# Patient Record
Sex: MALE | Race: WHITE | Employment: OTHER | URBAN - METROPOLITAN AREA
[De-identification: names, ages, dates, MRNs, and addresses within clinical notes are randomized per-mention and may not be internally consistent; named-entity substitution may affect disease eponyms.]

---

## 2017-02-09 RX ORDER — APIXABAN 5 MG/1
TABLET, FILM COATED ORAL
Qty: 180 TAB | Refills: 0 | Status: SHIPPED | OUTPATIENT
Start: 2017-02-09 | End: 2017-05-31 | Stop reason: SDUPTHER

## 2017-02-09 RX ORDER — RAMIPRIL 10 MG/1
CAPSULE ORAL
Qty: 90 CAP | Refills: 0 | Status: SHIPPED | OUTPATIENT
Start: 2017-02-09 | End: 2017-05-19 | Stop reason: SDUPTHER

## 2017-02-09 RX ORDER — HYDROCHLOROTHIAZIDE 25 MG/1
TABLET ORAL
Qty: 90 TAB | Refills: 0 | Status: SHIPPED | OUTPATIENT
Start: 2017-02-09 | End: 2017-05-19 | Stop reason: SDUPTHER

## 2017-03-15 RX ORDER — SIMVASTATIN 20 MG/1
TABLET, FILM COATED ORAL
Qty: 90 TAB | Refills: 0 | Status: SHIPPED | OUTPATIENT
Start: 2017-03-15 | End: 2017-05-19 | Stop reason: SDUPTHER

## 2017-05-19 RX ORDER — METOPROLOL SUCCINATE 25 MG/1
TABLET, EXTENDED RELEASE ORAL
Qty: 90 TAB | Refills: 0 | Status: SHIPPED | OUTPATIENT
Start: 2017-05-19 | End: 2017-05-31 | Stop reason: SDUPTHER

## 2017-05-19 RX ORDER — RAMIPRIL 10 MG/1
CAPSULE ORAL
Qty: 90 CAP | Refills: 0 | Status: SHIPPED | OUTPATIENT
Start: 2017-05-19 | End: 2017-05-31 | Stop reason: SDUPTHER

## 2017-05-19 RX ORDER — SIMVASTATIN 20 MG/1
TABLET, FILM COATED ORAL
Qty: 90 TAB | Refills: 0 | Status: SHIPPED | OUTPATIENT
Start: 2017-05-19 | End: 2017-05-19 | Stop reason: SDUPTHER

## 2017-05-19 RX ORDER — HYDROCHLOROTHIAZIDE 25 MG/1
TABLET ORAL
Qty: 90 TAB | Refills: 0 | Status: SHIPPED | OUTPATIENT
Start: 2017-05-19 | End: 2017-05-31 | Stop reason: SDUPTHER

## 2017-05-19 RX ORDER — SIMVASTATIN 20 MG/1
TABLET, FILM COATED ORAL
Qty: 90 TAB | Refills: 0 | Status: SHIPPED | OUTPATIENT
Start: 2017-05-19 | End: 2017-05-31 | Stop reason: SDUPTHER

## 2017-05-31 ENCOUNTER — OFFICE VISIT (OUTPATIENT)
Dept: CARDIOLOGY CLINIC | Age: 82
End: 2017-05-31

## 2017-05-31 VITALS
WEIGHT: 159 LBS | HEIGHT: 65 IN | RESPIRATION RATE: 16 BRPM | SYSTOLIC BLOOD PRESSURE: 150 MMHG | DIASTOLIC BLOOD PRESSURE: 80 MMHG | OXYGEN SATURATION: 98 % | BODY MASS INDEX: 26.49 KG/M2

## 2017-05-31 DIAGNOSIS — I25.10 CORONARY ARTERY DISEASE INVOLVING NATIVE CORONARY ARTERY OF NATIVE HEART WITHOUT ANGINA PECTORIS: ICD-10-CM

## 2017-05-31 DIAGNOSIS — I10 ESSENTIAL HYPERTENSION: ICD-10-CM

## 2017-05-31 DIAGNOSIS — Z01.810 PRE-OPERATIVE CARDIOVASCULAR EXAMINATION: ICD-10-CM

## 2017-05-31 DIAGNOSIS — I35.0 NONRHEUMATIC AORTIC VALVE STENOSIS: ICD-10-CM

## 2017-05-31 DIAGNOSIS — I49.3 PVC'S (PREMATURE VENTRICULAR CONTRACTIONS): ICD-10-CM

## 2017-05-31 DIAGNOSIS — Z95.1 S/P CABG X 3: ICD-10-CM

## 2017-05-31 DIAGNOSIS — I48.19 PERSISTENT ATRIAL FIBRILLATION (HCC): Primary | ICD-10-CM

## 2017-05-31 DIAGNOSIS — E78.5 DYSLIPIDEMIA: ICD-10-CM

## 2017-05-31 RX ORDER — METOPROLOL SUCCINATE 25 MG/1
TABLET, EXTENDED RELEASE ORAL
Qty: 90 TAB | Refills: 3 | Status: SHIPPED | OUTPATIENT
Start: 2017-05-31 | End: 2017-09-28 | Stop reason: SDUPTHER

## 2017-05-31 RX ORDER — SIMVASTATIN 20 MG/1
TABLET, FILM COATED ORAL
Qty: 90 TAB | Refills: 3 | Status: SHIPPED | OUTPATIENT
Start: 2017-05-31 | End: 2017-09-28 | Stop reason: SDUPTHER

## 2017-05-31 RX ORDER — RAMIPRIL 10 MG/1
CAPSULE ORAL
Qty: 90 CAP | Refills: 3 | Status: SHIPPED | OUTPATIENT
Start: 2017-05-31 | End: 2017-09-28 | Stop reason: SDUPTHER

## 2017-05-31 RX ORDER — HYDROCHLOROTHIAZIDE 25 MG/1
TABLET ORAL
Qty: 90 TAB | Refills: 3 | Status: SHIPPED | OUTPATIENT
Start: 2017-05-31 | End: 2017-09-28 | Stop reason: SDUPTHER

## 2017-05-31 NOTE — PROGRESS NOTES
Rao Estrada     1935       Brandon Cruz MD, [facility]  Date of Visit-5/31/2017   PCP is Jaycee Ray MD   [facility]  [facility]  HPI:  Rao Estrada is a 80 y.o. male     Follow up of CAD and AF. He is doing well with no cardiac complaints today. Denies chest pain, edema, syncope or shortness of breath at rest, has no tachycardia, palpitations or sense of arrhythmia. Sedentary but when you talk, where he lives , he walks to get all his supplies etc so actually active   Living in the [facility], here twice a year to visit his friends from [employer] who bring him for appointments  Wants to get a basal lesion near his naso-labilal fold taken off in the DR by his local doctor    Assessment/Plan:       CAD s/p CABG  -no angina  -continue betablocker and statin    Persistent AF  -good rate control    Continue Eliquis    Planning to have surgery on skin lesion at left upper lip   -can stop Eliquis 48 hours prior and resume 2 days after surgery    Leg swelling resolved with HCTZ    HTN  -at reasonable goal for him     Dyslipidemia  -on statin    PLAN:  -follow up in 6 months   -BMP and lipids this morning   Future Appointments  Date Time Provider [place]   11/30/2017 11:00 AM Nidhi Santos  [address]       Impression:   1. Persistent atrial fibrillation ([address]    2. Coronary artery disease involving native coronary artery of native heart without angina pectoris    3. Dyslipidemia    4. Essential hypertension    5. Nonrheumatic aortic valve stenosis    6. PVC's (premature ventricular contractions)    7. S/P CABG x 3       Cardiac History:      CABG Off pump 4/25/14 LIMA TO LAD, SVG TO DIAG, SVG TO OM2    CATH 4/23/14 complex distal lm, ostial lcx, non dominant rca. Normal lvef    TTE 11/2012 LVEF 55 % to 60 %. No RWMA. LVH, DD. JESS. MAC, mild to mod MR, mild ASc. Mild TR. Medical history: Includes colon cancer in 2011. Cranial surgeries in  and . Fractured vertebra in . Holter monitor 10/7/14 rhythm atrial fibrillation, PVCs, rate 34-89, 28 pauses up to 3.2 seconds, no PACs, frequent PVCs, 9.85% of all beats at 9,471 with some periods of ventricular bigeminy and trigeminy. Social history: Nonsmoker. No alcohol. Drinks one cup of coffee a day. Single. No children. Enjoys painting, reading, and is retired. Family history: Mother  of breast cancer at 72. Father  of heart attack at 54. SOLO 14=EF 55 % to 60 %. Moderate LAE with \"smoke\"   Left atrial appendage: The appendage was mildly dilated. There was a probable, medium-sized, regular, solid mass, measuring 11 mm x 9 mm in the  body of the appendage. There was moderate continuous spontaneous echo contrast (\"smoke\") in the appendage. Small PFO, moderate JARON,moderate MR; mild TR  Right atrium: The atrium was moderately dilated. Aortic valve: Systolic area of 2.1 cm squared by planimetry. mild atheroma in the mid descending  Due to thrombus in LA appendage, the cardioversion is cancelled. ROS-except as noted above. . A complete cardiac and respiratory are reviewed and negative except as above ; Resp-denies wheezing  or productive cough,.  Const- No unusual weight loss or fever; Neuro-no recent seizure or CVA ; GI- No BRBPR, abdom pain, bloating ; - no  hematuria   see supplement sheet, initialed and to be scanned by staff  Past Medical History:   Diagnosis Date    Aortic stenosis 10/24/2014    [facility] 2011    CAD (coronary artery disease) 2014    Cancer of transverse colon ([facility] 2011    surgery    Dyslipidemia 2014    Gastrointestinal disorder     Heart bloc atrioventricular 2014    Hypertension     Junctional rhythm 1/3/2011    PAF (paroxysmal atrial fibrillation) (HCC)     PVC's (premature ventricular contractions) 2014    S/P CABG x 3 2014      Social Hx= reports that he has never smoked. He has never used smokeless tobacco. He reports that he does not drink alcohol or use illicit drugs. Exam and Labs:    Visit Vitals    /80 (BP 1 Location: Left arm, BP Patient Position: Sitting)    Resp 16    Ht 5' 5\" (1.651 m)    Wt 159 lb (72.1 kg)    SpO2 98%    BMI 26.46 kg/m2      Constitutional:  NAD, comfortable  Head: NC,AT. Eyes: No scleral icterus. Neck:  Neck supple. No JVD present. Throat: moist mucous membranes. Chest: Effort normal & normal respiratory excursion . Neurological: alert, conversant and oriented . Skin: Skin is not cold. No obvious systemic rash noted. Not diaphoretic. No erythema. Psychiatric:  Grossly normal mood and affect. Behavior appears normal. Extremities:  no clubbing or cyanosis. Abdomen: non distended    Lungs:breath sounds normal. No stridor. distress, wheezes or  Rales. Heart: Irregularly irregular, hyperdynamic, normal S1, S2, no murmurs, rubs, clicks or gallops , PMI non displaced. Edema: Edema is none. Lab Results   Component Value Date/Time    Cholesterol, total 160 02/02/2016 09:45 AM    HDL Cholesterol 57 02/02/2016 09:45 AM    LDL, calculated 90 02/02/2016 09:45 AM    Triglyceride 64 02/02/2016 09:45 AM    CHOL/HDL Ratio 3.7 04/24/2014 06:08 AM     No results found for this or any previous visit.    Lab Results   Component Value Date/Time    Sodium 141 02/02/2016 09:45 AM    Potassium 4.3 02/02/2016 09:45 AM    Chloride 98 02/02/2016 09:45 AM    CO2 29 02/02/2016 09:45 AM    Anion gap 6 11/07/2014 10:01 AM    Glucose 93 02/02/2016 09:45 AM    BUN 17 02/02/2016 09:45 AM    Creatinine 0.95 02/02/2016 09:45 AM    BUN/Creatinine ratio 18 02/02/2016 09:45 AM    GFR est AA 88 02/02/2016 09:45 AM    GFR est non-AA 76 02/02/2016 09:45 AM    Calcium 9.3 02/02/2016 09:45 AM      Wt Readings from Last 3 Encounters:   05/31/17 159 lb (72.1 kg)   10/27/16 164 lb 9.6 oz (74.7 kg)   02/02/16 170 lb (77.1 kg)      BP Readings from Last 3 Encounters:   05/31/17 150/80   10/27/16 154/76   02/02/16 142/88        Current Outpatient Prescriptions   Medication Sig    metoprolol succinate (TOPROL-XL) 25 mg XL tablet TAKE 1 TABLET BY MOUTH EVERY DAY    ramipril (ALTACE) 10 mg capsule TAKE 1 CAPSULE EVERY DAY    hydroCHLOROthiazide (HYDRODIURIL) 25 mg tablet TAKE 1 TABLET EVERY DAY    simvastatin (ZOCOR) 20 mg tablet TAKE 1 TABLET BY MOUTH EVERY EVENING    ELIQUIS 5 mg tablet TAKE 1 TABLET TWICE DAILY    ferrous sulfate 325 mg (65 mg iron) tablet Take  by mouth Daily (before breakfast). No current facility-administered medications for this visit. Impression see above.     Written by Kaiser Rowland, as dictated by Marizol Cortes MD.

## 2017-05-31 NOTE — LETTER
6/2/2017 10:30 AM 
 
Mr. Shannan Burnette [address] [id number] Dear Shannan Burnette: 
 
Please find your most recent results below. Resulted Orders METABOLIC PANEL, BASIC Result Value Ref Range Glucose 192 (H) 65 - 99 mg/dL BUN 27 8 - 27 mg/dL Creatinine 1.26 0.76 - 1.27 mg/dL GFR est non-AA 54 (L) >59 mL/min/1.73 GFR est AA 62 >59 mL/min/1.73  
 BUN/Creatinine ratio 21 10 - 24 Sodium 141 134 - 144 mmol/L Potassium 4.1 3.5 - 5.2 mmol/L Chloride 97 96 - 106 mmol/L  
 CO2 27 18 - 29 mmol/L Calcium 9.0 8.6 - 10.2 mg/dL Narrative Performed at:  [address]  [id number] : Calos Lama MD, Phone:  [phone] LIPID PANEL Result Value Ref Range Cholesterol, total 158 100 - 199 mg/dL Triglyceride 52 0 - 149 mg/dL HDL Cholesterol 63 >39 mg/dL VLDL, calculated 10 5 - 40 mg/dL LDL, calculated 85 0 - 99 mg/dL Narrative Performed at:  [address]  [id number] : Calos Lama MD, Phone:  [phone] CVD REPORT Result Value Ref Range INTERPRETATION NTAP   
 PDF IMAGE Not applicable Narrative Performed at:  [address] [place] [address]  [id number] : Home Nam PhD, Phone:  [phone] CKD REPORT Result Value Ref Range Interpretation Note Comment:  
   ------------------------------- 
CHRONIC KIDNEY DISEASE: 
EGFR, BLOOD PRESSURE, AND PROTEINURIA ASSESSMENT Patient's eGFR was previously outside the scope of the 
program and now is 54 mL/min/1.73mE2 corresponding to CKD 
stage 3a. Multiply eGFR by 1.159 if patient is African American. Potassium is within goal and has not changed 
significantly, was 4.3 and now is 4.1 mmol/L. EGFR, BLOOD PRESSURE, AND PROTEINURIA TREATMENT SUGGESTIONS 
- 
Guidelines recommend a target blood pressure of 140/90 mmHg or less in CKD patients to reduce cardiovascular risk and 
CKD progression. A higher blood pressure target may be 
appropriate in older individuals to avoid symptomatic 
hypotension. Assessment of albuminuria (urine 
albumin:creatinine ratio or urine protein:creatinine ratio 
preferred) is recommended at least annually in CKD patients 
for staging and disease prognosis. EGFR, BLOOD PRESSURE, AND PROTEINURIA FOLLOW-UP 
- 
fasting Renal Panel within 12 months; Spot Urine Panel is 
recommen 
ded by KDOQI guidelines, at least yearly; 
- 
BONE and MINERAL ASSESSMENT Calcium is within goal and has not changed significantly, 
was 9.3 and now is 9.0 mg/dL. Carbon Dioxide is within goal 
and has not changed significantly, was 29 and now is 27 
mmol/L. KDOQI guidelines recommend the measurement of 
25-hydroxy vitamin D in patients with CKD. BONE and MINERAL TREATMENT SUGGESTIONS 
- Interpretations require simultaneous measurements of serum 
calcium and phosphorus. BONE and MINERAL FOLLOW-UP 
- 
fasting PTH with Renal Panel and 25-Hydroxy Vitamin D are 
recommended by KDOQI guidelines, at least yearly; 
- 
LIPIDS ASSESSMENT Blood was non-fasting; interpret these results with caution. LDL-C is normal and has not changed significantly, was 90 
and now is 85 mg/dL. Triglyceride is normal and has not 
changed significantly, was 64 and now is 52 mg/dL. Non-HDL Cholesterol is optimal and has not changed significantly, 
was 103 and now is 95 mg/dL. HDL-C is high and has risen, 
wa 
s 62 and now is 63 mg/dL. LIPIDS TREATMENT SUGGESTIONS 
- Therapeutic lifestyle changes are always valuable to 
maintain optimal blood lipid status (diet, exercise, weight 
management). If at least a 50% LDL reduction from baseline 
has not been achieved, begin or increase statin. Consider 
measurement of LDL particle number or Apo B to adjudicate 
need for further LDL lowering therapy. If statin cannot be tolerated or increased, alternatives include use of an 
intestinal agent (ezetimibe or bile acid sequestrant) or 
niacin. LIPIDS FOLLOW-UP 
- 
fasting Lipid Panel within 12 months; 
- 
ANEMIA ASSESSMENT Most recent order does not include a CBC Panel or iron 
studies. ANEMIA FOLLOW-UP 
- 
CBC is recommended by KDOQI guidelines, at least yearly; 
------------------------------- DISCLAIMER These assessments and treatment suggestions are provided as 
a convenience in support of the physician-patient 
relationship and are not intended to replace the physician's 
clinical judgment. They are derived from the national 
guidelines in addition to other evidence and expert opinion. The clinician should consider this information within the 
context of clinical opinion and the individual patient. SEE GUIDANCE FOR CHRONIC KIDNEY DISEASE PROGRAM: National 
Kidney Foundation Kidney Disease Outcomes Quality Initiative 
(KDOQI (TM)), with its limitations and disclaimers, are at 
www.kidney. org/professionals/KDOQI. Kidney Disease Improving Global Outcomes (KDIGO) clinical practice guidelines are at 
http://kdigo. org/home/guidelines/. The members of 
[address] national advisory panel are listed at 
www. Litholink.com. This program is intended for patients who 
have been diagnosed with stages 3, 4, or pre-dialysis 5 CKD. It is not intended for children, pregnant patients, or 
transplant patients. Narrative Performed at:  [address] [place] [address]  [id number] : Bob Munson PhD, Phone:  [phone] RECOMMENDATIONS: 
Labs look stable. Make sure to watch your blood sugar as it was slightly elevated on your lab work. Please call me if you have any questions: [phone] Sincerely, 
 
 
Arabella Henriquez MD

## 2017-05-31 NOTE — MR AVS SNAPSHOT
Visit Information Date & Time Provider Department Dept. Phone Encounter #  
 5/31/2017 11:00 AM Nidhi Santos MD [facility] [phone] [id number] Upcoming Health Maintenance Date Due DTaP/Tdap/Td series (1 - Tdap) 11/7/1958 ZOSTER VACCINE AGE 60> 11/7/1997 GLAUCOMA SCREENING Q2Y 11/7/2002 MEDICARE YEARLY EXAM 11/7/2002 Pneumococcal 65+ High/Highest Risk (2 of 2 - PPSV23) 1/3/2016 INFLUENZA AGE 9 TO ADULT 8/1/2017 Allergies as of 5/31/2017  Review Complete On: 5/31/2017 By: Nidhi Santos MD  
 No Known Allergies Current Immunizations  Reviewed on 1/6/2011 Name Date Pneumococcal Vaccine (Unspecified Type) 1/3/2011 10:18 PM  
  
 Not reviewed this visit You Were Diagnosed With   
  
 Codes Comments Persistent atrial fibrillation (HCC)    -  Primary ICD-10-CM: I48.1 ICD-9-CM: 427.31 Coronary artery disease involving native coronary artery of native heart without angina pectoris     ICD-10-CM: I25.10 ICD-9-CM: 414.01 Dyslipidemia     ICD-10-CM: E78.5 ICD-9-CM: 272.4 Essential hypertension     ICD-10-CM: I10 
ICD-9-CM: 401.9 Nonrheumatic aortic valve stenosis     ICD-10-CM: I35.0 ICD-9-CM: 424.1 PVC's (premature ventricular contractions)     ICD-10-CM: I49.3 ICD-9-CM: 427.69 S/P CABG x 3     ICD-10-CM: Z95.1 ICD-9-CM: V45.81 Vitals BP Resp Height(growth percentile) Weight(growth percentile) SpO2 BMI  
 150/80 (BP 1 Location: Left arm, BP Patient Position: Sitting) 16 5' 5\" (1.651 m) 159 lb (72.1 kg) 98% 26.46 kg/m2 Smoking Status Never Smoker BMI and BSA Data Body Mass Index Body Surface Area  
 26.46 kg/m 2 1.82 m 2 Preferred Pharmacy Pharmacy Name Phone [facility] # [address] [facility] [phone] Your Updated Medication List  
  
   
 This list is accurate as of: 5/31/17 11:36 AM.  Always use your most recent med list.  
  
  
  
  
 ELIQUIS 5 mg tablet Generic drug:  apixaban TAKE 1 TABLET TWICE DAILY ferrous sulfate 325 mg (65 mg iron) tablet Take  by mouth Daily (before breakfast). hydroCHLOROthiazide 25 mg tablet Commonly known as:  HYDRODIURIL  
TAKE 1 TABLET EVERY DAY  
  
 metoprolol succinate 25 mg XL tablet Commonly known as:  TOPROL-XL  
TAKE 1 TABLET BY MOUTH EVERY DAY  
  
 ramipril 10 mg capsule Commonly known as:  ALTACE  
TAKE 1 CAPSULE EVERY DAY  
  
 simvastatin 20 mg tablet Commonly known as:  ZOCOR  
TAKE 1 TABLET BY MOUTH EVERY EVENING We Performed the Following LIPID PANEL [87485 CPT(R)] METABOLIC PANEL, BASIC [58676 CPT(R)] Patient Instructions Please get labs drawn at [facility]. There is one located on the 3rd floor of [facility] office building in room 304. Also, Dr. Bimal Marcelo would like you to follow up with him in 6 months. Introducing [facility]! [facility] introduces V[employer] patient portal. Now you can access parts of your medical record, email your doctor's office, and request medication refills online. 1. In your internet browser, go to [email or web address] 2. Click on the First Time User? Click Here link in the Sign In box. You will see the New Member Sign Up page. 3. Enter your [employer] Access Code exactly as it appears below. You will not need to use this code after youve completed the sign-up process. If you do not sign up before the expiration date, you must request a new code. · [employer] Access Code: [email or web address] Expires: 8/29/2017 11:35 AM 
 
4. Enter the last four digits of your Social Security Number (xxxx) and Date of Birth (mm/dd/yyyy) as indicated and click Submit. You will be taken to the next sign-up page. 5. Create a [employer] ID.  This will be your [employer] login ID and cannot be changed, so think of one that is secure and easy to remember. 6. Create a EverTrue password. You can change your password at any time. 7. Enter your Password Reset Question and Answer. This can be used at a later time if you forget your password. 8. Enter your e-mail address. You will receive e-mail notification when new information is available in [address] 9. Click Sign Up. You can now view and download portions of your medical record. 10. Click the Download Summary menu link to download a portable copy of your medical information. If you have questions, please visit the Frequently Asked Questions section of the EverTrue website. Remember, EverTrue is NOT to be used for urgent needs. For medical emergencies, dial 911. Now available from your iPhone and Android! Please provide this summary of care documentation to your next provider. Your primary care clinician is listed as Magalys Cartwright. If you have any questions after today's visit, please call [phone].

## 2017-05-31 NOTE — PATIENT INSTRUCTIONS
Please get labs drawn at [facility]. There is one located on the 3rd floor of [facility] building in room 304. Also, Dr. Tisha Santos would like you to follow up with him in 6 months.

## 2017-06-01 LAB
BUN SERPL-MCNC: 27 MG/DL (ref 8–27)
BUN/CREAT SERPL: 21 (ref 10–24)
CALCIUM SERPL-MCNC: 9 MG/DL (ref 8.6–10.2)
CHLORIDE SERPL-SCNC: 97 MMOL/L (ref 96–106)
CHOLEST SERPL-MCNC: 158 MG/DL (ref 100–199)
CO2 SERPL-SCNC: 27 MMOL/L (ref 18–29)
CREAT SERPL-MCNC: 1.26 MG/DL (ref 0.76–1.27)
GLUCOSE SERPL-MCNC: 192 MG/DL (ref 65–99)
HDLC SERPL-MCNC: 63 MG/DL
INTERPRETATION, 910389: NORMAL
INTERPRETATION: NORMAL
LDLC SERPL CALC-MCNC: 85 MG/DL (ref 0–99)
PDF IMAGE, 910387: NORMAL
POTASSIUM SERPL-SCNC: 4.1 MMOL/L (ref 3.5–5.2)
SODIUM SERPL-SCNC: 141 MMOL/L (ref 134–144)
TRIGL SERPL-MCNC: 52 MG/DL (ref 0–149)
VLDLC SERPL CALC-MCNC: 10 MG/DL (ref 5–40)

## 2017-06-02 ENCOUNTER — TELEPHONE (OUTPATIENT)
Dept: CARDIOLOGY CLINIC | Age: 82
End: 2017-06-02

## 2017-06-02 NOTE — TELEPHONE ENCOUNTER
Addy Júnior calling on behalf of pt in reference to his obtaining pt's lab results. He can be reached at [phone]. Thanks!

## 2017-06-02 NOTE — TELEPHONE ENCOUNTER
Spoke to [employer]. Identifiers x 2. Verified that Mr. Dejesus is authorized to receive information. Per Dr. Celia Molina, kidney and lipids look fine. No change in regimen. Verbalized understanding.

## 2017-06-02 NOTE — TELEPHONE ENCOUNTER
----- Message from Kvng Ellsworth MD sent at 6/2/2017  8:54 AM EDT -----  Kidneys and lipids look fine  Glucose up, he needs to watch his diabetes mellitus   If you cant get him ( he lives in \[facility]"" and is leaving [place]) look who is contact on his form

## 2017-06-02 NOTE — PROGRESS NOTES
Kidneys and lipids look fine  Glucose up, he needs to watch his diabetes mellitus   If you cant get him ( he lives in [facility] and is leaving [place]) look who is contact on his form

## 2017-10-03 RX ORDER — SIMVASTATIN 20 MG/1
TABLET, FILM COATED ORAL
Qty: 90 TAB | Refills: 1 | Status: SHIPPED | OUTPATIENT
Start: 2017-10-03 | End: 2018-07-14 | Stop reason: SDUPTHER

## 2017-10-03 RX ORDER — METOPROLOL SUCCINATE 25 MG/1
TABLET, EXTENDED RELEASE ORAL
Qty: 90 TAB | Refills: 1 | Status: SHIPPED | OUTPATIENT
Start: 2017-10-03 | End: 2018-07-14 | Stop reason: SDUPTHER

## 2017-10-03 RX ORDER — RAMIPRIL 10 MG/1
CAPSULE ORAL
Qty: 90 CAP | Refills: 1 | Status: SHIPPED | OUTPATIENT
Start: 2017-10-03 | End: 2017-10-05 | Stop reason: SDUPTHER

## 2017-10-03 RX ORDER — HYDROCHLOROTHIAZIDE 25 MG/1
TABLET ORAL
Qty: 90 TAB | Refills: 1 | Status: SHIPPED | OUTPATIENT
Start: 2017-10-03 | End: 2017-10-05 | Stop reason: SDUPTHER

## 2017-10-05 RX ORDER — RAMIPRIL 10 MG/1
CAPSULE ORAL
Qty: 90 CAP | Refills: 1 | Status: SHIPPED | OUTPATIENT
Start: 2017-10-05 | End: 2018-07-14 | Stop reason: SDUPTHER

## 2017-10-05 RX ORDER — HYDROCHLOROTHIAZIDE 25 MG/1
TABLET ORAL
Qty: 90 TAB | Refills: 1 | Status: SHIPPED | OUTPATIENT
Start: 2017-10-05 | End: 2018-07-14 | Stop reason: SDUPTHER

## 2017-10-05 NOTE — TELEPHONE ENCOUNTER
Request for altace 10mg every day and HCTZ 25mg every day. Last office visit 5/31/17, next office visit 11/30/17.  Refills per verbal order from Dr. Dragan Little.

## 2017-12-04 ENCOUNTER — OFFICE VISIT (OUTPATIENT)
Dept: CARDIOLOGY CLINIC | Age: 82
End: 2017-12-04

## 2017-12-04 VITALS
BODY MASS INDEX: 26.82 KG/M2 | RESPIRATION RATE: 16 BRPM | HEIGHT: 65 IN | DIASTOLIC BLOOD PRESSURE: 84 MMHG | HEART RATE: 64 BPM | WEIGHT: 161 LBS | OXYGEN SATURATION: 98 % | SYSTOLIC BLOOD PRESSURE: 132 MMHG

## 2017-12-04 DIAGNOSIS — I10 ESSENTIAL HYPERTENSION: ICD-10-CM

## 2017-12-04 DIAGNOSIS — I35.0 NONRHEUMATIC AORTIC VALVE STENOSIS: ICD-10-CM

## 2017-12-04 DIAGNOSIS — I48.19 PERSISTENT ATRIAL FIBRILLATION (HCC): ICD-10-CM

## 2017-12-04 DIAGNOSIS — R73.09 ELEVATED GLUCOSE: Primary | ICD-10-CM

## 2017-12-04 DIAGNOSIS — I25.10 CORONARY ARTERY DISEASE INVOLVING NATIVE CORONARY ARTERY OF NATIVE HEART WITHOUT ANGINA PECTORIS: ICD-10-CM

## 2017-12-04 DIAGNOSIS — E78.5 DYSLIPIDEMIA: ICD-10-CM

## 2017-12-04 NOTE — MR AVS SNAPSHOT
Visit Information Date & Time Provider Department Dept. Phone Encounter #  
 12/4/2017 11:00 AM Bernardino Byers MD [facility] [phone] [id number] Upcoming Health Maintenance Date Due DTaP/Tdap/Td series (1 - Tdap) 11/7/1956 ZOSTER VACCINE AGE 60> 9/7/1997 GLAUCOMA SCREENING Q2Y 11/7/2002 MEDICARE YEARLY EXAM 11/7/2002 Pneumococcal 65+ High/Highest Risk (2 of 2 - PPSV23) 1/3/2016 Influenza Age 5 to Adult 8/1/2017 Allergies as of 12/4/2017  Review Complete On: 12/4/2017 By: Yazan Duncan No Known Allergies Current Immunizations  Reviewed on 1/6/2011 Name Date ZZZ-RETIRED (DO NOT USE) Pneumococcal Vaccine (Unspecified Type) 1/3/2011 10:18 PM  
  
 Not reviewed this visit Vitals BP Pulse Resp Height(growth percentile) Weight(growth percentile) SpO2  
 132/84 (BP 1 Location: Left arm) 64 16 5' 5\" (1.651 m) 161 lb (73 kg) 98% BMI Smoking Status 26.79 kg/m2 Never Smoker Vitals History BMI and BSA Data Body Mass Index Body Surface Area  
 26.79 kg/m 2 1.83 m 2 Preferred Pharmacy Pharmacy Name Phone [id number] [place] [place] [id number] [facility] [phone] Your Updated Medication List  
  
   
This list is accurate as of: 12/4/17 11:51 AM.  Always use your most recent med list.  
  
  
  
  
 apixaban 5 mg tablet Commonly known as:  ELIQUIS  
TAKE 1 TABLET TWICE DAILY ferrous sulfate 325 mg (65 mg iron) tablet Take  by mouth Daily (before breakfast). hydroCHLOROthiazide 25 mg tablet Commonly known as:  HYDRODIURIL  
TAKE 1 TABLET BY MOUTH EVERY DAY  
  
 metoprolol succinate 25 mg XL tablet Commonly known as:  TOPROL-XL  
TAKE 1 TABLET BY MOUTH EVERY DAY  
  
 ramipril 10 mg capsule Commonly known as:  ALTACE  
TAKE 1 CAPSULE EVERY DAY  
  
 simvastatin 20 mg tablet Commonly known as:  ZOCOR  
 TAKE 1 TABLET BY MOUTH EVERY EVENING Patient Instructions Need lab work, A1C today. Please follow up with Dr. Mary Bryan in 6 months. Introducing [facility]! [facility] introduces sCoolTV patient portal. Now you can access parts of your medical record, email your doctor's office, and request medication refills online. 1. In your internet browser, go to [email or web address] 2. Click on the First Time User? Click Here link in the Sign In box. You will see the New Member Sign Up page. 3. Enter your sCoolTV Access Code exactly as it appears below. You will not need to use this code after youve completed the sign-up process. If you do not sign up before the expiration date, you must request a new code. · sCoolTV Access Code: [email or web address] Expires: 2/13/2018  2:57 PM 
 
4. Enter the last four digits of your Social Security Number (xxxx) and Date of Birth (mm/dd/yyyy) as indicated and click Submit. You will be taken to the next sign-up page. 5. Create a sCoolTV ID. This will be your sCoolTV login ID and cannot be changed, so think of one that is secure and easy to remember. 6. Create a sCoolTV password. You can change your password at any time. 7. Enter your Password Reset Question and Answer. This can be used at a later time if you forget your password. 8. Enter your e-mail address. You will receive e-mail notification when new information is available in [address] 9. Click Sign Up. You can now view and download portions of your medical record. 10. Click the Download Summary menu link to download a portable copy of your medical information. If you have questions, please visit the Frequently Asked Questions section of the sCoolTV website. Remember, sCoolTV is NOT to be used for urgent needs. For medical emergencies, dial 911. Now available from your iPhone and Android! Please provide this summary of care documentation to your next provider. Your primary care clinician is listed as Emilie Baptiste. If you have any questions after today's visit, please call [phone].

## 2017-12-07 NOTE — PROGRESS NOTES
Nitin Kim     1935       Vipjaimie Georges MD, [facility] [place]  Date of Visit-12/4/2017   PCP is lAy Sims MD   [facility]  [facility]  HPI:  Nitin Kim is a [de-identified] y.o. male     Follow up of CAD and AF. He is doing well with no cardiac complaints today. Denies chest pain, edema, syncope or shortness of breath at rest, has no tachycardia, palpitations or sense of arrhythmia. Living in the [facility], here twice a year to visit his friends from [place] who bring him for appointments  No complaints  Labs showed high glucose but he was not fasting that day  Little bit of dizziness on standing suddenly , not falling      Assessment/Plan:       CAD s/p CABG  -no angina  -continue beta-blocker and statin    Persistent AF  -good rate control  Continue Eliquis    Leg swelling resolved with HCTZ    HTN  -at reasonable goal for him     Dyslipidemia  -on statin    PLAN:  -follow up in 6 months   -check Hgb A1C  Patient Instructions   Need lab work, A1C today. Please follow up with Dr. Ke Georges in 6 months. Future Appointments  Date Time Provider [place]   8/2/2018 10:20 AM Kevin Rosenbaum MD FAYE ALMEIDA SCHED       Impression:   1. Elevated glucose    2. Coronary artery disease involving native coronary artery of native heart without angina pectoris    3. Persistent atrial fibrillation ([address]    4. Dyslipidemia    5. Essential hypertension    6. Nonrheumatic aortic valve stenosis       Cardiac History:      CABG Off pump 4/25/14 LIMA TO LAD, SVG TO DIAG, SVG TO OM2    CATH 4/23/14 complex distal lm, ostial lcx, non dominant rca. Normal lvef    TTE 11/2012 LVEF 55 % to 60 %. No RWMA. LVH, DD. JESS. MAC, mild to mod MR, mild ASc. Mild TR. Medical history: Includes colon cancer in 2011. Cranial surgeries in 2009 and 1996. Fractured vertebra in 2011.     Holter monitor 10/7/14 rhythm atrial fibrillation, PVCs, rate 34-89, 28 pauses up to 3.2 seconds, no PACs, frequent PVCs, 9.85% of all beats at 9,471 with some periods of ventricular bigeminy and trigeminy. Social history: Nonsmoker. No alcohol. Drinks one cup of coffee a day. Single. No children. Enjoys painting, reading, and is retired. Family history: Mother  of breast cancer at 72. Father  of heart attack at 54. SOLO 14=EF 55 % to 60 %. Moderate LAE with \"smoke\"   Left atrial appendage: The appendage was mildly dilated. There was a probable, medium-sized, regular, solid mass, measuring 11 mm x 9 mm in the  body of the appendage. There was moderate continuous spontaneous echo contrast (\"smoke\") in the appendage. Small PFO, moderate JARON,moderate MR; mild TR  Right atrium: The atrium was moderately dilated. Aortic valve: Systolic area of 2.1 cm squared by planimetry. mild atheroma in the mid descending  Due to thrombus in LA appendage, the cardioversion is cancelled. ROS-except as noted above. . A complete cardiac and respiratory are reviewed and negative except as above ; Resp-denies wheezing  or productive cough,. Const- No unusual weight loss or fever; Neuro-no recent seizure or CVA ; GI- No BRBPR, abdom pain, bloating ; - no  hematuria   see supplement sheet, initialed and to be scanned by staff  Past Medical History:   Diagnosis Date    Aortic stenosis 10/24/2014    [facility]    CAD (coronary artery disease) 2014    [facility] 2011    surgery    Dyslipidemia 2014    Gastrointestinal disorder     Heart bloc atrioventricular 2014    Hypertension     Junctional rhythm 1/3/2011    PAF (paroxysmal atrial fibrillation) (HCC)     PVC's (premature ventricular contractions) 2014    S/P CABG x 3 2014      Social Hx= reports that he has never smoked. He has never used smokeless tobacco. He reports that he does not drink alcohol or use illicit drugs.      Exam and Labs:    Visit Vitals  /84 (BP 1 Location: Left arm)    Pulse 64    Resp 16    Ht 5' 5\" (1.651 m)    Wt 161 lb (73 kg)    SpO2 98%    BMI 26.79 kg/m2      Constitutional:  NAD, comfortable  Head: NC,AT. Eyes: No scleral icterus. Neck:  Neck supple. No JVD present. Throat: moist mucous membranes. Chest: Effort normal & normal respiratory excursion . Neurological: alert, conversant and oriented . Skin: Skin is not cold. No obvious systemic rash noted. Not diaphoretic. No erythema. Psychiatric:  Grossly normal mood and affect. Behavior appears normal. Extremities:  no clubbing or cyanosis. Abdomen: non distended    Lungs:breath sounds normal. No stridor. distress, wheezes or  Rales. Heart: Irregularly irregular, hyperdynamic, normal S1, S2, 2/6 systolic early/decresendo  murmur at RUSB to LUSB without radiation beyond apex , rubs, clicks or gallops , PMI non displaced. Edema: Edema is none. Lab Results   Component Value Date/Time    Cholesterol, total 158 05/31/2017 12:10 PM    HDL Cholesterol 63 05/31/2017 12:10 PM    LDL, calculated 85 05/31/2017 12:10 PM    Triglyceride 52 05/31/2017 12:10 PM    CHOL/HDL Ratio 3.7 04/24/2014 06:08 AM     No results found for this or any previous visit.    Lab Results   Component Value Date/Time    Sodium 141 05/31/2017 12:10 PM    Potassium 4.1 05/31/2017 12:10 PM    Chloride 97 05/31/2017 12:10 PM    CO2 27 05/31/2017 12:10 PM    Anion gap 6 11/07/2014 10:01 AM    Glucose 192 05/31/2017 12:10 PM    BUN 27 05/31/2017 12:10 PM    Creatinine 1.26 05/31/2017 12:10 PM    BUN/Creatinine ratio 21 05/31/2017 12:10 PM    GFR est AA 62 05/31/2017 12:10 PM    GFR est non-AA 54 05/31/2017 12:10 PM    Calcium 9.0 05/31/2017 12:10 PM      Wt Readings from Last 3 Encounters:   12/04/17 161 lb (73 kg)   05/31/17 159 lb (72.1 kg)   10/27/16 164 lb 9.6 oz (74.7 kg)      BP Readings from Last 3 Encounters:   12/04/17 132/84   05/31/17 150/80   10/27/16 154/76        Current Outpatient Prescriptions Medication Sig    ramipril (ALTACE) 10 mg capsule TAKE 1 CAPSULE EVERY DAY    hydroCHLOROthiazide (HYDRODIURIL) 25 mg tablet TAKE 1 TABLET BY MOUTH EVERY DAY    simvastatin (ZOCOR) 20 mg tablet TAKE 1 TABLET BY MOUTH EVERY EVENING    metoprolol succinate (TOPROL-XL) 25 mg XL tablet TAKE 1 TABLET BY MOUTH EVERY DAY    apixaban (ELIQUIS) 5 mg tablet TAKE 1 TABLET TWICE DAILY    ferrous sulfate 325 mg (65 mg iron) tablet Take  by mouth Daily (before breakfast). No current facility-administered medications for this visit. Impression see above.

## 2018-05-07 RX ORDER — APIXABAN 5 MG/1
TABLET, FILM COATED ORAL
Qty: 180 TAB | Refills: 1 | Status: SHIPPED | OUTPATIENT
Start: 2018-05-07 | End: 2018-10-12 | Stop reason: SDUPTHER

## 2018-05-07 NOTE — TELEPHONE ENCOUNTER
Request for eliquis 5 mg, BID. Last office visit 12/4/17,   Next office visit 8/2/18.      Refills per verbal order from Dr. David Velasquez.

## 2018-07-17 RX ORDER — METOPROLOL SUCCINATE 25 MG/1
TABLET, EXTENDED RELEASE ORAL
Qty: 90 TAB | Refills: 1 | Status: SHIPPED | OUTPATIENT
Start: 2018-07-17 | End: 2019-01-15 | Stop reason: SDUPTHER

## 2018-07-17 RX ORDER — HYDROCHLOROTHIAZIDE 25 MG/1
TABLET ORAL
Qty: 90 TAB | Refills: 1 | Status: SHIPPED | OUTPATIENT
Start: 2018-07-17 | End: 2019-01-15 | Stop reason: SDUPTHER

## 2018-07-17 RX ORDER — RAMIPRIL 10 MG/1
CAPSULE ORAL
Qty: 90 CAP | Refills: 1 | Status: SHIPPED | OUTPATIENT
Start: 2018-07-17 | End: 2019-04-13 | Stop reason: SDUPTHER

## 2018-07-17 RX ORDER — SIMVASTATIN 20 MG/1
TABLET, FILM COATED ORAL
Qty: 90 TAB | Refills: 1 | Status: SHIPPED | OUTPATIENT
Start: 2018-07-17 | End: 2019-01-15 | Stop reason: SDUPTHER

## 2018-07-17 NOTE — TELEPHONE ENCOUNTER
Request for HCTZ 25mg every day, zocor 20mg every day, metoprolol 25mg every day, ramipril 10mg every day. Last office visit 12/4/17, next office visit 8/2/18. last lipid panel 5/31/17.    Refills per verbal order from Dr. Reji Pennington.

## 2018-08-30 ENCOUNTER — TELEPHONE (OUTPATIENT)
Dept: CARDIOLOGY CLINIC | Age: 83
End: 2018-08-30

## 2018-08-30 NOTE — TELEPHONE ENCOUNTER
Spoke to patient's friend, Vern Wheatley. Name noted on permission to release information. Identifiers x 2. Patient scheduled for office visit with Dr. Rosita Hough and gastroenterologist on 10-18-18. To have colonoscopy on 10-22-18. Inquired regarding holding medications. Per Dr. Rosita Hough, to hold eliquis x 48 hours prior to colonoscopy. Isauro verbalized understanding.

## 2018-10-17 NOTE — PROGRESS NOTES
Buck Villanueva     1935       Brandon Guzman MD, [facility] [place] Date of Visit-10/18/2018 PCP is Marcia Campbell MD  
[address] [place] [address] [facility] [place] [facility] HPI:  Buck Villanueva is a [de-identified] y.o. male Follow up of CAD and AFib. Lives in hospitals. Here visits friend from [place] [address] bringing him to the appointment. Prior CABG and is on [address]. Overall the pt states he is doing well. Pt believes he's doing fine on a cardiac standing point, other than ongoing palpitations which have not been changed. Pt complains of leg infection which he states it's common in the hospitals and he would get fever. Pt states that he's feeling fine with his current medications, and he stays active with physical activities such as golf. Dizziness with some of the meds rarely Denies chest pain, edema, syncope or shortness of breath at rest, has no tachycardia, or sense of arrhythmia. EKG: Afib Assessment/Plan:    
CAD s/p CABG 
-no angina 
-continue beta-blocker and statin 
  
Persistent AF 
-good rate control Continue Eliquis 
  
Leg swelling resolved with HCTZ 
  
HTN 
-at reasonable goal for him  
  
Dyslipidemia 
-on statin 
  
PLAN: 
- Overall other than occasional leg swelling/infection, he seems to be doing fine. Follow up in 6 months and continue current meds. Impression: 1. Coronary artery disease involving native coronary artery of native heart without angina pectoris 2. Persistent atrial fibrillation ([address] 3. Essential hypertension 4. Dyslipidemia 5. Nonrheumatic aortic valve stenosis 6. Pedal edema Cardiac History:  
  
CABG Off pump 4/25/14 LIMA TO LAD, SVG TO DIAG, SVG TO OM2 
 
CATH 4/23/14 complex distal lm, ostial lcx, non dominant rca. Normal lvef 
 
TTE 11/2012 LVEF 55 % to 60 %. No RWMA. LVH, DD. JESS. MAC, mild to mod MR, mild ASc. Mild TR. Medical history: Includes colon cancer in . Cranial surgeries in  and . Fractured vertebra in . Holter monitor 10/7/14 rhythm atrial fibrillation, PVCs, rate 34-89, 28 pauses up to 3.2 seconds, no PACs, frequent PVCs, 9.85% of all beats at 9,471 with some periods of ventricular bigeminy and trigeminy. Social history: Nonsmoker. No alcohol. Drinks one cup of coffee a day. Single. No children. Enjoys painting, reading, and is retired. Family history: Mother  of breast cancer at 72. Father  of heart attack at 54. SOLO 14=EF 55 % to 60 %. Moderate LAE with \"smoke\" Left atrial appendage: The appendage was mildly dilated. There was a probable, medium-sized, regular, solid mass, measuring 11 mm x 9 mm in the 
body of the appendage. There was moderate continuous spontaneous echo contrast (\"smoke\") in the appendage. Small PFO, moderate JARON,moderate MR; mild TR Right atrium: The atrium was moderately dilated. Aortic valve: Systolic area of 2.1 cm squared by planimetry. mild atheroma in the mid descending Due to thrombus in LA appendage, the cardioversion is cancelled. ROS-except as noted above. . A complete cardiac and respiratory are reviewed and negative except as above ; Resp-denies wheezing  or productive cough,. Const- No unusual weight loss or fever; Neuro-no recent seizure or CVA ; GI- No BRBPR, abdom pain, bloating ; - no  hematuria  
see supplement sheet, initialed and to be scanned by staff Past Medical History:  
Diagnosis Date  Aortic stenosis 10/24/2014  ATN (acute tubular necrosis) (HCC) 2011  CAD (coronary artery disease) 2014  [facility] 2011  
 surgery  Dyslipidemia 2014  Gastrointestinal disorder  Heart bloc atrioventricular 2014  Hypertension  Junctional rhythm 1/3/2011  PAF (paroxysmal atrial fibrillation) [facility]  PVC's (premature ventricular contractions) 2014  S/P CABG x 3 4/28/2014 Social Hx= reports that  has never smoked. he has never used smokeless tobacco. He reports that he does not drink alcohol or use drugs. Exam and Labs:  /64 (BP 1 Location: Left arm, BP Patient Position: Sitting)   Pulse 65   Resp 12   Ht 5' 5\" (1.651 m)   Wt 159 lb (72.1 kg)   SpO2 98%   BMI 26.46 kg/m² Constitutional:  NAD, comfortable Head: NC,AT. Eyes: No scleral icterus. Neck:  Neck supple. No JVD present. Throat: moist mucous membranes. Chest: Effort normal & normal respiratory excursion . Neurological: alert, conversant and oriented . Skin: Skin is not cold. No obvious systemic rash noted. Not diaphoretic. No erythema. Psychiatric:  Grossly normal mood and affect. Behavior appears normal. Extremities:  no clubbing or cyanosis. Abdomen: non distended Lungs:breath sounds normal. No stridor. distress, wheezes or  Rales. Heart:IRIR with 2/6 CRISTIAN normal S1, S2, no rubs, clicks or gallops , PMI non displaced. Edema: Edema is 1+ edema. Lab Results Component Value Date/Time Cholesterol, total 158 05/31/2017 12:10 PM  
 HDL Cholesterol 63 05/31/2017 12:10 PM  
 LDL, calculated 85 05/31/2017 12:10 PM  
 Triglyceride 52 05/31/2017 12:10 PM  
 CHOL/HDL Ratio 3.7 04/24/2014 06:08 AM  
 
Lab Results Component Value Date/Time Sodium 141 05/31/2017 12:10 PM  
 Potassium 4.1 05/31/2017 12:10 PM  
 Chloride 97 05/31/2017 12:10 PM  
 CO2 27 05/31/2017 12:10 PM  
 Anion gap 6 11/07/2014 10:01 AM  
 Glucose 192 (H) 05/31/2017 12:10 PM  
 BUN 27 05/31/2017 12:10 PM  
 Creatinine 1.26 05/31/2017 12:10 PM  
 BUN/Creatinine ratio 21 05/31/2017 12:10 PM  
 GFR est AA 62 05/31/2017 12:10 PM  
 GFR est non-AA 54 (L) 05/31/2017 12:10 PM  
 Calcium 9.0 05/31/2017 12:10 PM  
  
Wt Readings from Last 3 Encounters:  
10/18/18 159 lb (72.1 kg) 12/04/17 161 lb (73 kg) 05/31/17 159 lb (72.1 kg) BP Readings from Last 3 Encounters:  
10/18/18 118/64  
12/04/17 132/84 05/31/17 150/80 Current Outpatient Medications Medication Sig  ELIQUIS 5 mg tablet TAKE ONE TABLET BY MOUTH TWICE DAILY  hydroCHLOROthiazide (HYDRODIURIL) 25 mg tablet TAKE 1 TABLET BY MOUTH EVERY DAY  simvastatin (ZOCOR) 20 mg tablet TAKE 1 TABLET BY MOUTH EVERY EVENING  
 metoprolol succinate (TOPROL-XL) 25 mg XL tablet TAKE 1 TABLET BY MOUTH EVERY DAY  ramipril (ALTACE) 10 mg capsule TAKE ONE CAPSULE BY MOUTH ONE TIME DAILY  ferrous sulfate 325 mg (65 mg iron) tablet Take  by mouth Daily (before breakfast). No current facility-administered medications for this visit. Impression see above.   
  
Written by Navya Wilson, as dictated by Yeni Johnson MD.

## 2018-10-18 ENCOUNTER — OFFICE VISIT (OUTPATIENT)
Dept: CARDIOLOGY CLINIC | Age: 83
End: 2018-10-18

## 2018-10-18 ENCOUNTER — APPOINTMENT (OUTPATIENT)
Dept: GENERAL RADIOLOGY | Age: 83
End: 2018-10-18
Attending: EMERGENCY MEDICINE
Payer: SELF-PAY

## 2018-10-18 ENCOUNTER — APPOINTMENT (OUTPATIENT)
Dept: CT IMAGING | Age: 83
End: 2018-10-18
Attending: EMERGENCY MEDICINE
Payer: SELF-PAY

## 2018-10-18 ENCOUNTER — HOSPITAL ENCOUNTER (EMERGENCY)
Age: 83
Discharge: HOME | End: 2018-10-18
Attending: EMERGENCY MEDICINE | Admitting: EMERGENCY MEDICINE
Payer: SELF-PAY

## 2018-10-18 VITALS
DIASTOLIC BLOOD PRESSURE: 75 MMHG | SYSTOLIC BLOOD PRESSURE: 160 MMHG | RESPIRATION RATE: 18 BRPM | OXYGEN SATURATION: 97 % | HEART RATE: 67 BPM | TEMPERATURE: 98 F

## 2018-10-18 VITALS
WEIGHT: 159 LBS | HEIGHT: 65 IN | DIASTOLIC BLOOD PRESSURE: 64 MMHG | RESPIRATION RATE: 12 BRPM | BODY MASS INDEX: 26.49 KG/M2 | SYSTOLIC BLOOD PRESSURE: 118 MMHG | HEART RATE: 65 BPM | OXYGEN SATURATION: 98 %

## 2018-10-18 DIAGNOSIS — I48.19 PERSISTENT ATRIAL FIBRILLATION (HCC): ICD-10-CM

## 2018-10-18 DIAGNOSIS — I25.10 CORONARY ARTERY DISEASE INVOLVING NATIVE CORONARY ARTERY OF NATIVE HEART WITHOUT ANGINA PECTORIS: Primary | ICD-10-CM

## 2018-10-18 DIAGNOSIS — S62.617A DISPLACED FRACTURE OF PROXIMAL PHALANX OF LEFT LITTLE FINGER, INITIAL ENCOUNTER FOR CLOSED FRACTURE: ICD-10-CM

## 2018-10-18 DIAGNOSIS — E78.5 DYSLIPIDEMIA: ICD-10-CM

## 2018-10-18 DIAGNOSIS — I35.0 NONRHEUMATIC AORTIC VALVE STENOSIS: ICD-10-CM

## 2018-10-18 DIAGNOSIS — I10 ESSENTIAL HYPERTENSION: ICD-10-CM

## 2018-10-18 DIAGNOSIS — S09.90XA CLOSED HEAD INJURY, INITIAL ENCOUNTER: Primary | ICD-10-CM

## 2018-10-18 DIAGNOSIS — R60.0 PEDAL EDEMA: ICD-10-CM

## 2018-10-18 PROCEDURE — 75810000301 HC ER LEVEL 1 CLOSED TREATMNT FRACTURE/DISLOCATION

## 2018-10-18 PROCEDURE — 70450 CT HEAD/BRAIN W/O DYE: CPT

## 2018-10-18 PROCEDURE — 74011000250 HC RX REV CODE- 250: Performed by: EMERGENCY MEDICINE

## 2018-10-18 PROCEDURE — 75810000053 HC SPLINT APPLICATION

## 2018-10-18 PROCEDURE — 73140 X-RAY EXAM OF FINGER(S): CPT

## 2018-10-18 PROCEDURE — 72125 CT NECK SPINE W/O DYE: CPT

## 2018-10-18 PROCEDURE — 99283 EMERGENCY DEPT VISIT LOW MDM: CPT

## 2018-10-18 RX ORDER — BUPIVACAINE HYDROCHLORIDE 5 MG/ML
5 INJECTION, SOLUTION EPIDURAL; INTRACAUDAL ONCE
Status: COMPLETED | OUTPATIENT
Start: 2018-10-18 | End: 2018-10-18

## 2018-10-18 RX ADMIN — BUPIVACAINE HYDROCHLORIDE 25 MG: 5 INJECTION, SOLUTION EPIDURAL; INTRACAUDAL; PERINEURAL at 17:14

## 2018-10-18 NOTE — DISCHARGE INSTRUCTIONS
Head Injury: After Your Visit to the Emergency Room  Your Care Instructions  You were seen in the emergency room for a head injury. Have another adult watch you closely for the next 24 hours. Ask the person to watch for signs that your head injury is getting worse, and make sure that he or she knows what to do if these signs appear. Even though you have been released from the emergency room, you still need to watch for any problems. The doctor carefully checked you. But sometimes problems can develop later. If you have new symptoms, or if your symptoms do not get better, return to the emergency room or call your doctor right away. A concussion means you hit your head hard enough to injure your brain. If you had a concussion, you may have symptoms that last for a few days to months. You may have changes in how well you think, concentrate, or remember; changes in your sleep patterns; or other symptoms. Although this is common after a concussion, any symptoms that are new or that are getting worse could be signs of a more serious problem. A visit to the emergency room is only one step in your treatment. Even if you feel better, you still need to do what your doctor recommends, such as going to all suggested follow-up appointments and taking medicines exactly as directed. This will help you recover and help prevent future problems. How can you care for yourself at home? · Take it easy for the next few days, or longer if you are not feeling well. Do not try to do too much. · Get plenty of sleep at night. Try to rest during the day. · Ask your doctor if you can take an over-the-counter pain medicine, such as acetaminophen (Tylenol), ibuprofen (Advil, Motrin), or naproxen (Aleve). Read and follow all instructions on the label. Do not take two or more pain medicines at the same time unless the doctor told you to. Many pain medicines have acetaminophen, which is Tylenol.  Too much acetaminophen (Tylenol) can be harmful. · Put ice or a cold pack on the area for 10 to 20 minutes at a time. Put a thin cloth between the ice and your skin. · Do not drink any alcohol for 24 hours or until your doctor tells you it is okay. · Avoid activities that could lead to another head injury. Avoid contact sports until your doctor says that you can do them. An athlete should never go back into a game after a head injury. · Until your doctor says it is okay, do not drive or do other things that require you to be alert. When should you call for help? Call 911 if:  · You have twitching, jerking, or a seizure. · You passed out (lost consciousness). · You have other symptoms that you think are a medical emergency. Return to the emergency room now if:  · You continue to vomit for more than 2 hours, or you have new vomiting. · You have clear or bloody fluid coming from your nose or ears. · The person checking on you has a hard time waking you. · You are confused, cannot think clearly, or do not know where you are. · You have trouble walking or talking. · You have new weakness or numbness in any part of your body. · You have bruises around both eyes (\"raccoon eyes\"). Call your doctor today if:  · Your headaches get worse. Where can you learn more? Go to [employer].be  Enter C324 in the search box to learn more about \"Head Injury: After Your Visit to the Emergency Room. \"   © 3832-4098 Healthwise, Incorporated. Care instructions adapted under license by [employer] (which disclaims liability or warranty for this information). This care instruction is for use with your licensed healthcare professional. If you have questions about a medical condition or this instruction, always ask your healthcare professional. [address] any warranty or liability for your use of this information. Content Version: 9.4.78947;  Last Revised: July 27, 2010            Caring for Your Splint: After Your Visit  Your Care Instructions     A splint protects a broken bone or other injury. If you have a removable splint, follow your doctor's instructions and only remove the splint if your doctor says you can. Most splints can be adjusted. Your doctor will show you how to do this and will tell you when you might need to adjust the splint. Many splints are premade. Your doctor may also make a splint from plaster or fiberglass. Some splints have a built-in air cushion. Air pads are inflated to hold the injured area in place. Follow-up care is a key part of your treatment and safety. Be sure to make and go to all appointments, and call your doctor if you are having problems. It's also a good idea to know your test results and keep a list of the medicines you take. How can you care for yourself at home? General care  · Don't put any weight on a splint. If you have a walking boot, your doctor will tell you when you can put weight on it. · If the fingers or toes on the limb with the splint were not injured, wiggle them every now and then. This helps move the blood and fluids in the injured limb. · Prop up the injured arm or leg on a pillow when you ice it or anytime you sit or lie down during the next 3 days. Try to keep it above the level of your heart. This will help reduce swelling. · Put ice or cold packs on the hurt area for 10 to 20 minutes at a time. Try to do this every 1 to 2 hours for the next 3 days (when you are awake) or until the swelling goes down. Be careful not to get the splint wet. · Be safe with medicines. Read and follow all instructions on the label. ¨ If the doctor gave you a prescription medicine for pain, take it as prescribed. ¨ If you are not taking a prescription pain medicine, ask your doctor if you can take an over-the-counter medicine. · Keep up your muscle strength and tone as much as you can while protecting your injured limb or joint.  Your doctor may want you to tense and relax the muscles protected by the splint. Check with your doctor or physical therapist for instructions. Splint and skin care  · If your splint is not to be removed, try blowing cool air from a hair dryer or fan into the splint to help relieve itching. Never stick items under your splint to scratch the skin. · Do not use oils or lotions near your splint. If the skin becomes red or sore around the edge of the splint, you may pad the edges with a soft material, such as moleskin, or use tape to cover the edges. · If you're allowed to take your splint off, be sure your skin is dry before you put it back on. · Watch for pressure sores. These can develop over bony areas. Symptoms include a warm spot under the cast, pain, drainage, or an odor. Call your doctor if you think you have a pressure sore. · Watch for compartment syndrome. This happens when pressure builds up in a group of muscles, nerves, and blood vessels. It is an emergency. Symptoms include severe pain or tingling or numbness. Water and your splint  · Keep your splint dry. Moisture can collect under the splint and cause skin irritation and itching. If you have a wound or have had surgery, moisture under the splint can increase the risk of infection. · Cover your splint with at least two layers of plastic when you take a shower or bath, unless your doctor said you can take it off while bathing. · If you can take the splint off when you bathe, pat the area dry after bathing and put the splint back on.  · If your splint gets a little wet, you can dry it with a hair dryer. Use a \"cool\" setting. When should you call for help? Call your doctor now or seek immediate medical care if:  · You have increased or severe pain. · You feel a warm or painful spot under the splint. · You have problems with your splint. For example:  ¨ The skin under the splint burns or stings. ¨ The splint feels too tight. ¨ There is a lot of swelling near the splint.  (Some swelling is normal.)  ¨ You have a new fever. ¨ There is drainage or a bad smell coming from the splint. · Your foot or hand is cool or pale or changes color. · You have trouble moving your fingers or toes. · You have symptoms of a blood clot in your arm or leg (called a deep vein thrombosis). These may include:  ¨ Pain in the arm, calf, back of the knee, thigh, or groin. ¨ Redness and swelling in the arm, leg, or groin. Watch closely for changes in your health, and be sure to contact your doctor if:  · The splint is breaking apart or losing its shape. · You are not getting better as expected. Where can you learn more? Go to [employer]  Enter Q210 in the search box to learn more about \"Caring for Your Splint: After Your Visit. \"   © 0476-5465 Healthwise, Incorporated. Care instructions adapted under license by [facility] [employer] (which disclaims liability or warranty for this information). This care instruction is for use with your licensed healthcare professional. If you have questions about a medical condition or this instruction, always ask your healthcare professional. [place] [address] any warranty or liability for your use of this information.   Content Version: 29.5.434717; Current as of: June 4, 2014

## 2018-10-18 NOTE — ED TRIAGE NOTES
TRIAGE NOTE:  Patient arrives after tripping and falling, patient reports hit face denies LOC. And pinkie finger on left hand, has deformity.

## 2018-10-18 NOTE — ED PROVIDER NOTES
[de-identified] y.o. male with past medical history significant for paroxysmal atrial fibrillation, hypertension, cancer of transverse colon, PVC's, CAD, and aortic stenosis who presents from home with chief complaint of CHI and injury to L 5th finger. Patient was leaving his routine visit with Dr. Anuel Giordano today when he had a mechanical GLF in which he landed on his right face and bilateral knees onto concrete. Patient reports immediate onset of left pinky deformity and deviation, bilateral knee pain with an associated small abrasion to the right knee, but denies right facial pain. Patient endorses head trauma but denies LOC. Upon examination at [facility] ED patient reports continuation of bilateral knee pain and left pinky pain with associated deformity and deviation. Patient reports limited ROM of left pinky finger. Patient is on Eliquis. Patient reports history of breaking his bilateral wrists. Pt denies neck pain, dizziness, weakness, fever, chills, cough, congestion, shortness of breath, chest pain, abdominal pain, nausea, vomiting, diarrhea, difficulty with urination or dysuria. Old Chart Review: Patient was seen by cardiologist Dr. Sj Bundy MD in office today which was normal. Patient is on Eliquis. There are no other acute medical concerns at this time. PCP: Beth Box MD 
 
Note written by Eze Shields, as dictated by Bakari Mark DO 5:07 PM 
 
 
 
 
 
The history is provided by the patient. Past Medical History:  
Diagnosis Date  Aortic stenosis 10/24/2014  ATN (acute tubular necrosis) (HCC) 1/4/2011  CAD (coronary artery disease) 4/23/2014  Cancer of transverse colon ([facility] [address] 1/20/2011  
 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  
Procedure Laterality Date  CARDIAC SURG PROCEDURE UNLIST  mid April 2014 Triple bypass  HX COLECTOMY  1/5/2011 Transverse d/t mass - Dr. Delfino Gallardo  HX HERNIA REPAIR    
 HX TONSILLECTOMY Family History:  
Problem Relation Age of Onset  Breast Cancer Mother  Heart Attack Father Social History Socioeconomic History  Marital status: SINGLE Spouse name: Not on file  Number of children: Not on file  Years of education: Not on file  Highest education level: Not on file Social Needs  Financial resource strain: Not on file  Food insecurity - worry: Not on file  Food insecurity - inability: Not on file  Transportation needs - medical: Not on file  Transportation needs - non-medical: Not on file Occupational History  Not on file Tobacco Use  Smoking status: Never Smoker  Smokeless tobacco: Never Used Substance and Sexual Activity  Alcohol use: No  
 Drug use: No  
 Sexual activity: Not on file Other Topics Concern [address] Service Not Asked  Blood Transfusions No  
 Caffeine Concern No  
 Occupational Exposure No  
 Hobby Hazards Not Asked  Sleep Concern No  
 Stress Concern No  
 Weight Concern No  
 Special Diet Yes Comment: low cholesterol diet  Back Care Yes  Exercise Yes Comment: walking  Bike Helmet Not Asked [address] Yes  Self-Exams No  
Social History Narrative  Not on file ALLERGIES: Patient has no known allergies. Review of Systems Musculoskeletal: Positive for arthralgias (Knee pain). Left pinky pain Skin: Positive for wound. All other systems reviewed and are negative. Vitals:  
 10/18/18 1634 BP: 163/76 Pulse: 65 Resp: 18 Temp: 98.1 °F (36.7 °C) SpO2: 98% Physical Exam  
Nursing note and vitals reviewed. Constitutional: Pt is awake and alert. Pt appears well-developed and well-nourished. NAD. HENT:  
Head: Normocephalic and atraumatic. Nose: Nose normal.  
Mouth/Throat: Oropharynx is clear and moist. No oropharyngeal exudate. Eyes: Conjunctivae and extraocular motions are normal. Pupils are equal, round, and reactive to light. Right eye exhibits no discharge. Left eye exhibits no discharge. No scleral icterus. Neck: No tracheal deviation present. Supple neck. Nontender. Cardiovascular: Normal rate, regular rhythm, normal heart sounds and intact distal pulses. Exam reveals no gallop and no friction rub. No murmur heard. Pulmonary/Chest: Effort normal and breath sounds normal.  Pt  has no wheezes. Pt  has no rales. Abdominal: Soft. Pt  exhibits no distension and no mass. No tenderness. Pt  has no rebound and no guarding. Musculoskeletal:  Pt  exhibits no edema and no tenderness. Ext: . Left pinky deformity and deviation. Neurological:  Pt is alert. nonfocal neuro exam. 
Skin: Skin is warm and dry. Pt  is not diaphoretic. Small abrasion to right knee. Psychiatric:  Pt  has a normal mood and affect. Behavior is normal.  
Note written by Eze Finnegan, as dictated by Morales Borrero DO 5:07 PM 
 
 
 
 
 
[facility] Procedures PROGRESS NOTE: 
5:34 PM Provider gave a digital block to the patient's left 5th finger. reviewed xrays 6:31 PM - reduction done. Procedure note: reduction left 5th finger - prox phalanx fx. After digital block, I manipulated the digit and achieved an improved reduction/appearance - xrays ordered and reviewed - improved alignment. Joana Zazueta DO DC home Reviewed CT images as well D/w pt and son - call hand surgery tomorrow for a f/u appt. pics after reduction: 
 
 
 
 
 
 
 
Communicated with Dr Suellen Lombard about visit via [employer].

## 2018-10-18 NOTE — PROGRESS NOTES
1. Have you been to the ER, urgent care clinic since your last visit? Hospitalized since your last visit? No 
 
2. Have you seen or consulted any other health care providers outside of the [address] [place] [address] since your last visit? Include any pap smears or colon screening. No  
 
 
Pt reports Med Rec. Completed. Visit Vitals /64 (BP 1 Location: Left arm, BP Patient Position: Sitting) Pulse 65 Resp 12 Ht 5' 5\" (1.651 m) Wt 159 lb (72.1 kg) SpO2 98% BMI 26.46 kg/m²

## 2018-10-19 NOTE — ED NOTES
Discharge instructions given to pt by MD and RN . Pt has been given counseling on med use and verbalizes  understanding . Pt ambulated off unit with no signs of distress.

## 2019-01-15 DIAGNOSIS — E78.5 DYSLIPIDEMIA: ICD-10-CM

## 2019-01-15 DIAGNOSIS — I35.0 NONRHEUMATIC AORTIC (VALVE) STENOSIS: ICD-10-CM

## 2019-01-15 DIAGNOSIS — I10 ESSENTIAL HYPERTENSION: ICD-10-CM

## 2019-01-15 DIAGNOSIS — I25.10 CORONARY ARTERY DISEASE INVOLVING NATIVE CORONARY ARTERY OF NATIVE HEART WITHOUT ANGINA PECTORIS: Primary | ICD-10-CM

## 2019-01-15 RX ORDER — HYDROCHLOROTHIAZIDE 25 MG/1
TABLET ORAL
Qty: 90 TAB | Refills: 3 | Status: SHIPPED | OUTPATIENT
Start: 2019-01-15 | End: 2020-01-16

## 2019-01-15 RX ORDER — SIMVASTATIN 20 MG/1
TABLET, FILM COATED ORAL
Qty: 90 TAB | Refills: 3 | Status: SHIPPED | OUTPATIENT
Start: 2019-01-15 | End: 2020-01-16

## 2019-01-15 RX ORDER — METOPROLOL SUCCINATE 25 MG/1
TABLET, EXTENDED RELEASE ORAL
Qty: 90 TAB | Refills: 3 | Status: SHIPPED | OUTPATIENT
Start: 2019-01-15 | End: 2020-01-16

## 2019-01-15 NOTE — TELEPHONE ENCOUNTER
Request for HCTZ 25mg daily, metoprolol 25mg daily & simvastatin 20mg daily. Last office visit 10-18-18, next office visit 4-18-19.  Refills per verbal order from Dr. Abi Quick.

## 2019-04-09 ENCOUNTER — TELEPHONE (OUTPATIENT)
Dept: CARDIOLOGY CLINIC | Age: 84
End: 2019-04-09

## 2019-04-09 NOTE — TELEPHONE ENCOUNTER
Pharmacy states they received prescription refills for patient and all were written for 90-days supply except one that was written for 30-days. Asking for clarification.     Pharmacy # [phone]

## 2019-04-09 NOTE — TELEPHONE ENCOUNTER
Verified patient with two types of identifiers. [facility] pharmacist unsure of who called or why. They will call with any other questions or concerns.

## 2019-04-12 ENCOUNTER — TELEPHONE (OUTPATIENT)
Dept: CARDIOLOGY CLINIC | Age: 84
End: 2019-04-12

## 2019-04-12 NOTE — TELEPHONE ENCOUNTER
Pt's son called stating [place] and will not be back until April 16th and he is flying back on the 23rd so he would need an appointment on the 17th of April. He stated that would be the best day.   Phone #[phone]  Thanks

## 2019-04-12 NOTE — TELEPHONE ENCOUNTER
Verified patient with two types of identifiers. Rescheduled patient's April 18th appointment to the 17th. Patient's son verbalized understanding and will call with any other questions.

## 2019-04-13 DIAGNOSIS — I10 ESSENTIAL HYPERTENSION: Primary | ICD-10-CM

## 2019-04-15 RX ORDER — RAMIPRIL 10 MG/1
10 CAPSULE ORAL DAILY
Qty: 90 CAP | Refills: 3 | Status: SHIPPED | OUTPATIENT
Start: 2019-04-15 | End: 2020-03-11

## 2019-04-15 NOTE — TELEPHONE ENCOUNTER
Patient's son is calling because the Ramipril was called in for 30 day supply, but all the other medications were called in for 90 days. He is requesting that you change the Rampril to a 90 day supply so all of his meds can  at the same time. Pharmacy confirmed.      Phone: [phone]

## 2019-04-15 NOTE — TELEPHONE ENCOUNTER
Request for ramipril 10mg daily. Last office visit 10-18-18, next office visit 4-17-19.  Refills per verbal order from Dr. Margarito Brar.

## 2019-04-17 ENCOUNTER — OFFICE VISIT (OUTPATIENT)
Dept: CARDIOLOGY CLINIC | Age: 84
End: 2019-04-17

## 2019-04-17 VITALS
HEART RATE: 65 BPM | OXYGEN SATURATION: 98 % | HEIGHT: 65 IN | DIASTOLIC BLOOD PRESSURE: 60 MMHG | SYSTOLIC BLOOD PRESSURE: 120 MMHG | BODY MASS INDEX: 26.66 KG/M2 | WEIGHT: 160 LBS | RESPIRATION RATE: 16 BRPM

## 2019-04-17 DIAGNOSIS — E78.5 DYSLIPIDEMIA: ICD-10-CM

## 2019-04-17 DIAGNOSIS — I10 ESSENTIAL HYPERTENSION: ICD-10-CM

## 2019-04-17 DIAGNOSIS — I48.19 PERSISTENT ATRIAL FIBRILLATION (HCC): Primary | ICD-10-CM

## 2019-04-17 DIAGNOSIS — Z95.1 S/P CABG X 3: ICD-10-CM

## 2019-04-17 DIAGNOSIS — I25.10 CORONARY ARTERY DISEASE INVOLVING NATIVE CORONARY ARTERY OF NATIVE HEART WITHOUT ANGINA PECTORIS: ICD-10-CM

## 2019-04-17 NOTE — PROGRESS NOTES
Visit Vitals /60 (BP 1 Location: Left arm, BP Patient Position: Sitting) Pulse 65 Resp 16 Ht 5' 5\" (1.651 m) Wt 160 lb (72.6 kg) SpO2 98% BMI 26.63 kg/m²

## 2019-04-17 NOTE — PROGRESS NOTES
Savage Cook     1935       Brandon Sanon MD, [facility]  Date of Visit-4/17/2019   PCP is Nella Lopez MD   [address] [place] [address] [facility]  [facility]  HPI:  Savage Cook is a 82 y.o. male   F/u of CAD and atrial fibrillation . Prior CABG. Lives in [facility]. Here visits friend from [place] [address] bringing him to the appointment. Overall the pt states he is doing well and he feels that it's been about the same. Pt denies any bleeding issues and is anticoagulated with Eliquis. Pt has some skin lesion on his left side of his face but has not had that checked yet. Pt usually comes back for about 2 weeks but he's otherwise in [facility]. Pt is present with a family member. Denies chest pain, edema, syncope or shortness of breath at rest, has no tachycardia, palpitations or sense of arrhythmia. Assessment/Plan:     1. CAD s/p CABG   - No angina.   - Continue BB and Statin. 2. Persistent Atrial fibrillation     Rate controlled and doing excellent. On Eliquis and has no bleeding issues. 3. HTN. At goal.  Leg swelling resolved. BP Readings from Last 6 Encounters:   04/17/19 120/60   10/18/18 160/75   10/18/18 118/64   12/04/17 132/84   05/31/17 150/80   10/27/16 154/76       4. Dyslipidemia. On Statin. simva  Lab Results   Component Value Date/Time    LDL, calculated 85 05/31/2017 12:10 PM    at goal in past on lower intensity, does not want to change to higher potency statin    5. Systolic murmur  No recent echo, will address at fu  But remains NYHA 1 with no louder murmur or CHF  Future Appointments   Date Time Provider [place]   10/14/2019 10:20 AM Jameson Bajwa  [address]      Patient Instructions   You will need to follow up in clinic with Dr. Donavan Sanon in 6 months. Key CAD CHF Meds             ramipril (ALTACE) 10 mg capsule (Taking) Take 1 Cap by mouth daily.     metoprolol succinate (TOPROL-XL) 25 mg XL tablet (Taking) TAKE 1 TABLET BY MOUTH EVERY DAY    simvastatin (ZOCOR) 20 mg tablet (Taking) TAKE 1 TABLET BY MOUTH EVERY EVENING    hydroCHLOROthiazide (HYDRODIURIL) 25 mg tablet (Taking) TAKE 1 TABLET BY MOUTH EVERY DAY    apixaban (ELIQUIS) 5 mg tablet (Taking) TAKE ONE TABLET BY MOUTH TWICE DAILY            Impression:   1. Persistent atrial fibrillation ([address]    2. Coronary artery disease involving native coronary artery of native heart without angina pectoris    3. S/P CABG x 3    4. Essential hypertension    5. Dyslipidemia    6. Nonrheumatic aortic valve stenosis       Cardiac History:      CABG Off pump 14 LIMA TO LAD, SVG TO DIAG, SVG TO OM2    CATH 14 complex distal lm, ostial lcx, non dominant rca. Normal lvef    TTE 2012 LVEF 55 % to 60 %. No RWMA. LVH, DD. JESS. MAC, mild to mod MR, mild ASc. Mild TR. Medical history: Includes colon cancer in . Cranial surgeries in  and . Fractured vertebra in . Holter monitor 10/7/14 rhythm atrial fibrillation, PVCs, rate 34-89, 28 pauses up to 3.2 seconds, no PACs, frequent PVCs, 9.85% of all beats at 9,471 with some periods of ventricular bigeminy and trigeminy. Social history: Nonsmoker. No alcohol. Drinks one cup of coffee a day. Single. No children. Enjoys painting, reading, and is retired. Family history: Mother  of breast cancer at 72. Father  of heart attack at 54. SOLO 14=EF 55 % to 60 %. Moderate LAE with \"smoke\"   Left atrial appendage: The appendage was mildly dilated. There was a probable, medium-sized, regular, solid mass, measuring 11 mm x 9 mm in the  body of the appendage. There was moderate continuous spontaneous echo contrast (\"smoke\") in the appendage. Small PFO, moderate JAORN,moderate MR; mild TR  Right atrium: The atrium was moderately dilated. Aortic valve: Systolic area of 2.1 cm squared by planimetry.  mild atheroma in the mid descending  Due to thrombus in LA appendage, the cardioversion is cancelled. ROS-except as noted above. . A complete cardiac and respiratory are reviewed and negative except as above ; Resp-denies wheezing  or productive cough,. Const- No unusual weight loss or fever; Neuro-no recent seizure or CVA ; GI- No BRBPR, abdom pain, bloating ; - no  hematuria   see supplement sheet, initialed and to be scanned by staff  Past Medical History:   Diagnosis Date    Aortic stenosis 10/24/2014    ATN (acute tubular necrosis) ([facility] [address] 1/4/2011    CAD (coronary artery disease) 4/23/2014    Cancer of transverse colon (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 He has never used smokeless tobacco. He reports that he does not drink alcohol or use drugs. Exam and Labs:  /60 (BP 1 Location: Left arm, BP Patient Position: Sitting)   Pulse 65   Resp 16   Ht 5' 5\" (1.651 m)   Wt 160 lb (72.6 kg)   SpO2 98%   BMI 26.63 kg/m² Constitutional:  NAD, comfortable  Head: NC,AT. Eyes: No scleral icterus. Neck:  Neck supple. No JVD present. Throat: moist mucous membranes. Chest: Effort normal & normal respiratory excursion . Neurological: alert, conversant and oriented . Skin: Skin is not cold. No obvious systemic rash noted. Not diaphoretic. No erythema. Psychiatric:  Grossly normal mood and affect. Behavior appears normal. Extremities:  no clubbing or cyanosis. Abdomen: non distended    Lungs:breath sounds normal. No stridor. distress, wheezes or  Rales. Heart:IRIR with 1-2/6 CRISTIAN on RUSB normal rate, regular rhythm, normal S1, S2, no rubs, clicks or gallops , PMI non displaced. Edema: Edema is none.   Lab Results   Component Value Date/Time Cholesterol, total 158 05/31/2017 12:10 PM    HDL Cholesterol 63 05/31/2017 12:10 PM    LDL, calculated 85 05/31/2017 12:10 PM    Triglyceride 52 05/31/2017 12:10 PM    CHOL/HDL Ratio 3.7 04/24/2014 06:08 AM     Lab Results   Component Value Date/Time    Sodium 141 05/31/2017 12:10 PM    Potassium 4.1 05/31/2017 12:10 PM    Chloride 97 05/31/2017 12:10 PM    CO2 27 05/31/2017 12:10 PM    Anion gap 6 11/07/2014 10:01 AM    Glucose 192 (H) 05/31/2017 12:10 PM    BUN 27 05/31/2017 12:10 PM    Creatinine 1.26 05/31/2017 12:10 PM    BUN/Creatinine ratio 21 05/31/2017 12:10 PM    GFR est AA 62 05/31/2017 12:10 PM    GFR est non-AA 54 (L) 05/31/2017 12:10 PM    Calcium 9.0 05/31/2017 12:10 PM      Wt Readings from Last 3 Encounters:   04/17/19 160 lb (72.6 kg)   10/18/18 159 lb (72.1 kg)   12/04/17 161 lb (73 kg)      BP Readings from Last 3 Encounters:   04/17/19 120/60   10/18/18 160/75   10/18/18 118/64      Current Outpatient Medications   Medication Sig    ramipril (ALTACE) 10 mg capsule Take 1 Cap by mouth daily.  metoprolol succinate (TOPROL-XL) 25 mg XL tablet TAKE 1 TABLET BY MOUTH EVERY DAY    simvastatin (ZOCOR) 20 mg tablet TAKE 1 TABLET BY MOUTH EVERY EVENING    hydroCHLOROthiazide (HYDRODIURIL) 25 mg tablet TAKE 1 TABLET BY MOUTH EVERY DAY    apixaban (ELIQUIS) 5 mg tablet TAKE ONE TABLET BY MOUTH TWICE DAILY    ferrous sulfate 325 mg (65 mg iron) tablet Take  by mouth Daily (before breakfast). No current facility-administered medications for this visit. Impression see above.       Written by Malgorzata Turpin, as dictated by Brynn Carrero MD.

## 2019-10-10 DIAGNOSIS — I48.19 PERSISTENT ATRIAL FIBRILLATION (HCC): Primary | ICD-10-CM

## 2019-10-14 NOTE — TELEPHONE ENCOUNTER
Request for Eliquis 5mg BID. Last office visit 4-17-19, next office visit 10-24-19.  Refills per verbal order from Dr. Mian Prakash.

## 2019-10-24 ENCOUNTER — OFFICE VISIT (OUTPATIENT)
Dept: CARDIOLOGY CLINIC | Age: 84
End: 2019-10-24

## 2019-10-24 VITALS
WEIGHT: 153.8 LBS | BODY MASS INDEX: 25.62 KG/M2 | DIASTOLIC BLOOD PRESSURE: 90 MMHG | HEIGHT: 65 IN | SYSTOLIC BLOOD PRESSURE: 197 MMHG | HEART RATE: 62 BPM | OXYGEN SATURATION: 100 %

## 2019-10-24 DIAGNOSIS — J34.89 NASAL MASS: ICD-10-CM

## 2019-10-24 DIAGNOSIS — I35.0 NONRHEUMATIC AORTIC VALVE STENOSIS: ICD-10-CM

## 2019-10-24 DIAGNOSIS — I25.10 CORONARY ARTERY DISEASE INVOLVING NATIVE CORONARY ARTERY OF NATIVE HEART WITHOUT ANGINA PECTORIS: ICD-10-CM

## 2019-10-24 DIAGNOSIS — I48.11 LONGSTANDING PERSISTENT ATRIAL FIBRILLATION (HCC): Primary | ICD-10-CM

## 2019-10-24 DIAGNOSIS — I10 ESSENTIAL HYPERTENSION: ICD-10-CM

## 2019-10-24 DIAGNOSIS — Z95.1 S/P CABG X 3: ICD-10-CM

## 2019-10-24 DIAGNOSIS — E78.5 DYSLIPIDEMIA: ICD-10-CM

## 2019-10-24 NOTE — PROGRESS NOTES
Visit Vitals  /90 (BP 1 Location: Right arm, BP Patient Position: Supine)   Pulse 62   Ht 5' 5\" (1.651 m)   Wt 153 lb 12.8 oz (69.8 kg)   SpO2 100%   BMI 25.59 kg/m²

## 2019-10-24 NOTE — PROGRESS NOTES
Peg Bocanegra     1935       Brandon Khalil MD, [facility]  Date of Visit-10/24/2019   PCP is Tracy Stover MD   [facility]  [facility]  HPI:  Peg Bocanegra is a 82 y.o. male   6 month f/u of CAD and atrial fibrillation . Prior CABG. Lives in [facility]. Pt is present with a friend. Overall the pt states he is doing well. Pt states his heart rhythm has stayed about the same. Pt denies dizziness when standing. Pt states he has not had a biopsy on the skin lesion on the left side of his face yet. He states he has been sent to many different doctors and each one keeps sending him to other places in the medical system in the [place]. Pt notes that his skin lesion has gotten bigger. Pt notes his BP is not usually in the 323'K systolic. Pt states he has been very stressed lately trying to move from his home  in the [facility], sounds like he may have been evicted, he lives there year round and visits twice a year for 2 weeks. Denies chest pain, edema, syncope or shortness of breath at rest, has no tachycardia, palpitations or sense of arrhythmia. EKG: Atrial fibrillation  at 60           TTE 2012 LVEF 55 % to 60 %. No RWMA. LVH, DD. JESS. MAC, mild to mod MR, mild ASc. Mild TR. Medical history: Includes colon cancer in . Cranial surgeries in  and . Fractured vertebra in . Holter monitor 10/7/14 rhythm atrial fibrillation, PVCs, rate 34-89, 28 pauses up to 3.2 seconds, no PACs, frequent PVCs, 9.85% of all beats at 9,471 with some periods of ventricular bigeminy and trigeminy. Social history: Nonsmoker. No alcohol. Drinks one cup of coffee a day. Single. No children. Enjoys painting, reading, and is retired. Family history: Mother  of breast cancer at 72. Father  of heart attack at 54. SOLO 14=EF 55 % to 60 %. Moderate LAE with \"smoke\"   Left atrial appendage: The appendage was mildly dilated.  There was a probable, medium-sized, regular, solid mass, measuring 11 mm x 9 mm in the  body of the appendage. There was moderate continuous spontaneous echo contrast (\"smoke\") in the appendage. Small PFO, moderate JARON,moderate MR; mild TR  Right atrium: The atrium was moderately dilated. Aortic valve: Systolic area of 2.1 cm squared by planimetry. mild atheroma in the mid descending  Due to thrombus in LA appendage, the cardioversion is cancelled. Assessment/Plan:     1. CAD s/p CABG   - No angina. CABG Off pump 4/25/14 LIMA TO LAD, SVG TO DIAG, SVG TO OM2    - Continue BB and Statin.     CATH 4/23/14 complex distal lm, ostial lcx, non dominant rca. Normal lvef  2. Persistent Atrial fibrillation     Rate controlled and doing excellent. On Eliquis and has no bleeding issues.      3. HTN. No leg swelling. BP is high today but it is an isolated value. I am going to take BP at home for the next week. I don't want to overreact to one BP being elevated. He is asymptomatic. 4. Dyslipidemia. On Statin. Q-Botva  Lab Results   Component Value Date/Time     LDL, calculated 85 05/31/2017 12:10 PM    at goal in past on lower intensity, does not want to change to higher potency statin     5. Systolic murmur Much softer today. No clinical sx's. 6. Skin mass. Left lip. Will try to get him in with general surgery this week for a biopsy. F/u in 6 months     Impression:   1. Longstanding persistent atrial fibrillation    2. Coronary artery disease involving native coronary artery of native heart without angina pectoris    3. Essential hypertension    4. Dyslipidemia    5. Nonrheumatic aortic valve stenosis    6. S/P CABG x 3    7. Nasal mass       Cardiac History:      CABG Off pump 4/25/14 LIMA TO LAD, SVG TO DIAG, SVG TO OM2    CATH 4/23/14 complex distal lm, ostial lcx, non dominant rca. Normal lvef    TTE 11/2012 LVEF 55 % to 60 %. No RWMA. LVH, DD. JESS. MAC, mild to mod MR, mild ASc. Mild TR.   Medical history: Includes colon cancer in . Cranial surgeries in  and . Fractured vertebra in . Holter monitor 10/7/14 rhythm atrial fibrillation, PVCs, rate 34-89, 28 pauses up to 3.2 seconds, no PACs, frequent PVCs, 9.85% of all beats at 9,471 with some periods of ventricular bigeminy and trigeminy. Social history: Nonsmoker. No alcohol. Drinks one cup of coffee a day. Single. No children. Enjoys painting, reading, and is retired. Family history: Mother  of breast cancer at 72. Father  of heart attack at 54. SOLO 14=EF 55 % to 60 %. Moderate LAE with \"smoke\"   Left atrial appendage: The appendage was mildly dilated. There was a probable, medium-sized, regular, solid mass, measuring 11 mm x 9 mm in the  body of the appendage. There was moderate continuous spontaneous echo contrast (\"smoke\") in the appendage. Small PFO, moderate JARON,moderate MR; mild TR  Right atrium: The atrium was moderately dilated. Aortic valve: Systolic area of 2.1 cm squared by planimetry. mild atheroma in the mid descending  Due to thrombus in LA appendage, the cardioversion is cancelled. ROS-except as noted above. . A complete cardiac and respiratory are reviewed and negative except as above ; Resp-denies wheezing  or productive cough,.  Const- No unusual weight loss or fever; Neuro-no recent seizure or CVA ; GI- No BRBPR, abdom pain, bloating ; - no  hematuria   see supplement sheet, initialed and to be scanned by staff  Past Medical History:   Diagnosis Date    Aortic stenosis 10/24/2014    ATN [facility]acute tubular necrosis) ([facility] [address] 2011    CAD (coronary artery disease) 2014    Cancer of transverse colon ([facility] [address] 2011    surgery    Dyslipidemia 2014    Gastrointestinal disorder     Heart bloc atrioventricular 2014    Hypertension     Junctional rhythm 1/3/2011    PAF (paroxysmal atrial fibrillation) (HCC)     PVC's (premature ventricular contractions) 2014    S/P CABG x 3 2014 Social Hx= reports that he has never smoked. He has never used smokeless tobacco. He reports that he does not drink alcohol or use drugs. Exam and Labs:  /90 (BP 1 Location: Right arm, BP Patient Position: Supine)   Pulse 62   Ht 5' 5\" (1.651 m)   Wt 153 lb 12.8 oz (69.8 kg)   SpO2 100%   BMI 25.59 kg/m² Constitutional:  NAD, comfortable  Head: NC,AT. Eyes: No scleral icterus. Neck:  Neck supple. No JVD present. Throat: moist mucous membranes. Chest: Effort normal & normal respiratory excursion . Neurological: alert, conversant and oriented . Skin: Skin is not cold. No obvious systemic rash noted. Not diaphoretic. No erythema. Psychiatric:  Grossly normal mood and affect. Behavior appears normal. Extremities:  no clubbing or cyanosis. Abdomen: non distended    Lungs:breath sounds normal. No stridor. distress, wheezes or  Rales. Heart:IRIR normal rate, regular rhythm, normal S1, S2, no murmurs, rubs, clicks or gallops , PMI non displaced. Edema: Edema is none.   Lab Results   Component Value Date/Time    Cholesterol, total 158 05/31/2017 12:10 PM    HDL Cholesterol 63 05/31/2017 12:10 PM    LDL, calculated 85 05/31/2017 12:10 PM    Triglyceride 52 05/31/2017 12:10 PM    CHOL/HDL Ratio 3.7 04/24/2014 06:08 AM     Lab Results   Component Value Date/Time    Sodium 141 05/31/2017 12:10 PM    Potassium 4.1 05/31/2017 12:10 PM    Chloride 97 05/31/2017 12:10 PM    CO2 27 05/31/2017 12:10 PM    Anion gap 6 11/07/2014 10:01 AM    Glucose 192 (H) 05/31/2017 12:10 PM    BUN 27 05/31/2017 12:10 PM    Creatinine 1.26 05/31/2017 12:10 PM    BUN/Creatinine ratio 21 05/31/2017 12:10 PM    GFR est AA 62 05/31/2017 12:10 PM    GFR est non-AA 54 (L) 05/31/2017 12:10 PM    Calcium 9.0 05/31/2017 12:10 PM      Wt Readings from Last 3 Encounters:   10/24/19 153 lb 12.8 oz (69.8 kg)   04/17/19 160 lb (72.6 kg)   10/18/18 159 lb (72.1 kg)      BP Readings from Last 3 Encounters:   10/24/19 197/90   04/17/19 120/60 10/18/18 160/75      Current Outpatient Medications   Medication Sig    apixaban (ELIQUIS) 5 mg tablet Take 1 Tab by mouth two (2) times a day.  ramipril (ALTACE) 10 mg capsule Take 1 Cap by mouth daily.  metoprolol succinate (TOPROL-XL) 25 mg XL tablet TAKE 1 TABLET BY MOUTH EVERY DAY    simvastatin (ZOCOR) 20 mg tablet TAKE 1 TABLET BY MOUTH EVERY EVENING    hydroCHLOROthiazide (HYDRODIURIL) 25 mg tablet TAKE 1 TABLET BY MOUTH EVERY DAY    ferrous sulfate 325 mg (65 mg iron) tablet Take  by mouth Daily (before breakfast). No current facility-administered medications for this visit. Impression see above.       Written by Mars Bergeron, as dictated by Rah Simms MD.

## 2019-10-24 NOTE — PATIENT INSTRUCTIONS
Please keep a blood pressure diary. Please check your blood pressure once a day at different times. Be sure to make sure you are sitting still for at least five minutes with both feet flat on the floor and arm heart level. Please write down the blood pressure, heart rate, date and time. Call [employer] at [phone] after one week or send us a [employer] message.

## 2019-10-24 NOTE — Clinical Note
10/24/19 Patient: Talia Fair YOB: 1935 Date of Visit: 10/24/2019 Caprice Jennings MD 
VIA Dear Caprice Jennings MD, Thank you for referring Mr. Talia Fair to [facility] for evaluation. My notes for this consultation are attached. If you have questions, please do not hesitate to call me. I look forward to following your patient along with you.  
 
 
Sincerely, 
 
Margaret Bella MD

## 2019-10-28 ENCOUNTER — TELEPHONE (OUTPATIENT)
Dept: CARDIOLOGY CLINIC | Age: 84
End: 2019-10-28

## 2019-10-28 NOTE — TELEPHONE ENCOUNTER
Verified patient with two types of identifiers. Spoke to patient's son (on HIPAA) and let him know I made an appointment with surgical dermatology at 0900 on Wednesday the 30th. Gave him that office's address and number. Patient's son verbalized understanding and will call with any other questions.

## 2019-10-30 ENCOUNTER — OFFICE VISIT (OUTPATIENT)
Dept: DERMATOLOGY | Facility: AMBULATORY SURGERY CENTER | Age: 84
End: 2019-10-30

## 2019-10-30 ENCOUNTER — HOSPITAL ENCOUNTER (OUTPATIENT)
Dept: LAB | Age: 84
Discharge: HOME | End: 2019-10-30

## 2019-10-30 VITALS
BODY MASS INDEX: 26.66 KG/M2 | WEIGHT: 160 LBS | OXYGEN SATURATION: 98 % | SYSTOLIC BLOOD PRESSURE: 190 MMHG | HEART RATE: 76 BPM | RESPIRATION RATE: 16 BRPM | HEIGHT: 65 IN | TEMPERATURE: 97.9 F | DIASTOLIC BLOOD PRESSURE: 82 MMHG

## 2019-10-30 DIAGNOSIS — D48.5 NEOPLASM OF UNCERTAIN BEHAVIOR OF SKIN OF LIP: Primary | ICD-10-CM

## 2019-10-30 NOTE — PROGRESS NOTES
Room: 6    Identified pt with two pt identifiers(name and ). Reviewed record in preparation for visit and have obtained necessary documentation. All patient medications has been reviewed. Chief Complaint   Patient presents with    Cyst     Pt is c/o nasal cyst near his left nostril.  Skin Exam       Health Maintenance Due   Topic    DTaP/Tdap/Td series (1 - Tdap)    Shingrix Vaccine Age 50> (1 of 2)    GLAUCOMA SCREENING Q2Y     Pneumococcal 65+ years (1 of 2 - PCV13)    Influenza Age 5 to Adult        Vitals:    10/30/19 0855   BP: 190/82   Pulse: 76   Resp: 16   Temp: 97.9 °F (36.6 °C)   TempSrc: Oral   SpO2: 98%   Weight: 72.6 kg (160 lb)   Height: 5' 5\" (1.651 m)   PainSc:   0 - No pain       1. Have you been to the ER, urgent care clinic since your last visit? Hospitalized since your last visit? No    2. Have you seen or consulted any other health care providers outside of the [address] [place] [address] since your last visit? Include any pap smears or colon screening. No    Patient is accompanied by self I have received verbal consent from Ezequiel Bell to discuss any/all medical information while they are present in the room.

## 2019-10-30 NOTE — PROGRESS NOTES
Written by Colton Benito, as dictated by Black Gruber, [address]. Name: Melly Mcdonald       Age: 82 y.o. Date: 10/30/2019    Chief Complaint:   Chief Complaint   Patient presents with    Cyst     Pt is c/o nasal cyst near his left nostril.  Skin Exam       Subjective:    HPI:  Mr.. Melly Mcdonald is a 82 y.o. male who presents for the evaluation of a lesion on the left upper lip with his son. He states that the lesion appeared 1 year ago. The patient has not had prior treatment for this lesion. Associated symptoms include bothersome lesion due to location and increasing size and bleeding. The patient denies any pain. The patient was referred by Dr. Rocio Taylor for this evaluation. The patient is a resident of the [facility] (has lived there for 20 years) and returns to the [address] [place] [address] every 6 months to see Dr. Rocio Taylor. He stays with a close family friend that he states he may as well be his son. He is feeling well and in his usual state of health today. He has no current illnesses, no other skin concerns. His allergies, medications, medical, and social history are reviewed by me today. He is on blood thinning medication: Eliquis    The patient's pertinent skin history includes: No personal or family history of skin cancer. Has lived in the [facility] for the past 20 years.      ROS: Consitutional: Negative  Dermatological : positive for - skin lesion changes    Social History     Socioeconomic History    Marital status: SINGLE     Spouse name: Not on file    Number of children: Not on file    Years of education: Not on file    Highest education level: Not on file   Occupational History    Not on file   Social Needs    Financial resource strain: Not on file    Food insecurity:     Worry: Not on file     Inability: Not on file    Transportation needs:     Medical: Not on file     Non-medical: Not on file   Tobacco Use    Smoking status: Never Smoker    Smokeless tobacco: Never Used   Substance and Sexual Activity    Alcohol use: No    Drug use: No    Sexual activity: Not on file   Lifestyle    Physical activity:     Days per week: Not on file     Minutes per session: Not on file    Stress: Not on file   Relationships    Social connections:     Talks on phone: Not on file     Gets together: Not on file     Attends [employer] service: Not on file     Active member of club or organization: Not on file     Attends meetings of clubs or organizations: Not on file     Relationship status: Not on file    Intimate partner violence:     Fear of current or ex partner: Not on file     Emotionally abused: Not on file     Physically abused: Not on file     Forced sexual activity: Not on file   Other Topics Concern     Service Not Asked    Blood Transfusions No    Caffeine Concern No    Occupational Exposure No    Hobby Hazards Not Asked    Sleep Concern No    Stress Concern No    Weight Concern No    Special Diet Yes     Comment: low cholesterol diet    Back Care Yes    Exercise Yes     Comment: walking    Bike Helmet Not Asked    Seat Belt Yes    Self-Exams No   Social History Narrative    Not on file       Family History   Problem Relation Age of Onset    Breast Cancer Mother     Heart Attack Father        Past Medical History:   Diagnosis Date    Aortic stenosis 10/24/2014    ATN (acute tubular necrosis) ([facility] [address] 1/4/2011    CAD (coronary artery disease) 4/23/2014    Cancer of transverse colon ([facility] [address] 1/20/2011    surgery    Dyslipidemia 6/12/2014    Gastrointestinal disorder     Heart bloc atrioventricular 4/21/2014    Hypertension     Junctional rhythm 1/3/2011    PAF (paroxysmal atrial fibrillation) ([facility] [address]     PVC's (premature ventricular contractions) 9/8/2014    S/P CABG x 3 4/28/2014       Past Surgical History:   Procedure Laterality Date    CARDIAC SURG PROCEDURE UNLIST  mid April 2014    Triple bypass    HX COLECTOMY  1/5/2011 Transverse d/t mass - Dr. Barrington Rowland HX HERNIA REPAIR      HX TONSILLECTOMY         Current Outpatient Medications   Medication Sig Dispense Refill    apixaban (ELIQUIS) 5 mg tablet Take 1 Tab by mouth two (2) times a day. 180 Tab 3    ramipril (ALTACE) 10 mg capsule Take 1 Cap by mouth daily. 90 Cap 3    metoprolol succinate (TOPROL-XL) 25 mg XL tablet TAKE 1 TABLET BY MOUTH EVERY DAY 90 Tab 3    simvastatin (ZOCOR) 20 mg tablet TAKE 1 TABLET BY MOUTH EVERY EVENING 90 Tab 3    hydroCHLOROthiazide (HYDRODIURIL) 25 mg tablet TAKE 1 TABLET BY MOUTH EVERY DAY 90 Tab 3    ferrous sulfate 325 mg (65 mg iron) tablet Take  by mouth Daily (before breakfast). No Known Allergies      Objective:    Visit Vitals  /82 (BP 1 Location: Left arm, BP Patient Position: Sitting)   Pulse 76   Temp 97.9 °F (36.6 °C) (Oral)   Resp 16   Ht 5' 5\" (1.651 m)   Wt 160 lb (72.6 kg)   SpO2 98%   BMI 26.63 kg/m²       Osiris Valenzuela is a 82 y.o. male who appears well and in no distress. He is awake, alert, and oriented. There is no preauricular, submandibular, or cervical lymphadenopathy. A limited skin examination was completed including the face and scalp. There are scattered seborrheic keratoses. He has lentigines. There are thin scaled AKs on the left sideburn, cheek and scalp, he has a blue nevus on the left vertex scalp. There is a firm large mass on the left upper cutaneous and vermilion lip that extends up to the nasal/ lip triangle, down the melolabial fold, up into the nostril, onto the ala, and about half way across the lip. This is most consistent with advanced Basal Cell Carcinoma. Photos from today's visit:               Left upper lip      Assessment/Plan:  Neoplasm of Uncertain Behavior, left upper lip, favors BCC. The differential diagnoses were discussed. A shave biopsy was advised to sample this lesion. The procedure was reviewed and verbal and written consent were obtained.   The risks of pain, bleeding, infection, and scar were discussed. The patient is aware that this is a sample and is intended for diagnosis and not therapy of the skin lesion. I performed the procedure. The site was cleansed and anesthetized with 1% Lidocaine with Epinephrine 1:100,000. A shave biopsy was performed to sample the lesion. Drysol was used for hemostasis. The wound was bandaged and care reviewed. The specimen was sent to pathology. I will contact the patient with the results and any further treatment that may be necessary. Potentials for treating were discussed including surgery with reconstruction by a plastic surgeon vs radiation vs oral medication (Erivedge). Risks, side effects, and benefits were also discussed. I feel Elwyn  is likely the best option for him given his living situation, age, medical status. He was given a handout to review about this medication. Actinic keratoses, seborrheic keratoses, blue nevus. No treatment for now. Will address AKs at a later date as appropriate. This plan was reviewed with the patient and patient agrees. All questions were answered. This scribe documentation was reviewed by me and accurately reflects the examination and decisions made by me. [facility]   OFFICE PROCEDURE PROGRESS NOTE   Chart reviewed for the following:   Oscar MORALES, have reviewed the History, Physical and updated the Allergic reactions for Ran Syed. TIME OUT performed immediately prior to start of procedure:   ISamantha, have performed the following reviews on Ran [place]   prior to the start of the procedure:     * Patient was identified by name and date of birth   * Agreement on procedure being performed was verified   * Risks and Benefits explained to the patient   * Procedure site verified and marked as necessary   * Patient was positioned for comfort   * Consent was signed and verified     Time: 9:20 am  Date of procedure: 10/30/2019  Procedure performed by: Erminio Duane.  Fayrene Arch  Provider assisted by: Abigail Seth LPN  Patient assisted by: self   How tolerated by patient: tolerated the procedure well with no complications   Comments: none

## 2019-11-01 ENCOUNTER — OFFICE VISIT (OUTPATIENT)
Dept: DERMATOLOGY | Facility: AMBULATORY SURGERY CENTER | Age: 84
End: 2019-11-01

## 2019-11-01 VITALS
TEMPERATURE: 97.6 F | RESPIRATION RATE: 14 BRPM | DIASTOLIC BLOOD PRESSURE: 90 MMHG | BODY MASS INDEX: 26.66 KG/M2 | HEART RATE: 76 BPM | OXYGEN SATURATION: 95 % | WEIGHT: 160 LBS | HEIGHT: 65 IN | SYSTOLIC BLOOD PRESSURE: 158 MMHG

## 2019-11-01 DIAGNOSIS — C44.01 BASAL CELL CARCINOMA (BCC) OF SKIN OF LIP: Primary | ICD-10-CM

## 2019-11-01 NOTE — PROGRESS NOTES
Written by Gera Vazquez, as dictated by Saray Perez, [address]. Name: Osiris Valenzuela       Age: 82 y.o. Date: 11/1/2019    Chief Complaint:   Chief Complaint   Patient presents with    Skin Exam     consult       Subjective:    HPI:  Mr.. Osiris Valenzuela is a 82 y.o. male who presents for consultation and test results of biopsy proven BCC on the left upper lip and treatment with Erivedge. The patient has some post biopsy bleeding but doing well now. He is leaving tomorrow to return back home to the [facility]. He is feeling well and in his usual state of health today. He has no current illnesses, no other skin concerns. His allergies, medications, medical, and social history are reviewed by me today.     ROS: Consitutional: Negative  Dermatological : negative    Social History     Socioeconomic History    Marital status: SINGLE     Spouse name: Not on file    Number of children: Not on file    Years of education: Not on file    Highest education level: Not on file   Occupational History    Not on file   Social Needs    Financial resource strain: Not on file    Food insecurity:     Worry: Not on file     Inability: Not on file    Transportation needs:     Medical: Not on file     Non-medical: Not on file   Tobacco Use    Smoking status: Never Smoker    Smokeless tobacco: Never Used   Substance and Sexual Activity    Alcohol use: No    Drug use: No    Sexual activity: Not on file   Lifestyle    Physical activity:     Days per week: Not on file     Minutes per session: Not on file    Stress: Not on file   Relationships    Social connections:     Talks on phone: Not on file     Gets together: Not on file     Attends [employer] service: Not on file     Active member of club or organization: Not on file     Attends meetings of clubs or organizations: Not on file     Relationship status: Not on file    Intimate partner violence:     Fear of current or ex partner: Not on file Emotionally abused: Not on file     Physically abused: Not on file     Forced sexual activity: Not on file   Other Topics Concern     Service Not Asked    Blood Transfusions No    Caffeine Concern No    Occupational Exposure No    Hobby Hazards Not Asked    Sleep Concern No    Stress Concern No    Weight Concern No    Special Diet Yes     Comment: low cholesterol diet    Back Care Yes    Exercise Yes     Comment: walking    Bike Helmet Not Asked    Seat Belt Yes    Self-Exams No   Social History Narrative    Not on file       Family History   Problem Relation Age of Onset    Breast Cancer Mother     Heart Attack Father        Past Medical History:   Diagnosis Date    Aortic stenosis 10/24/2014    ATN (acute tubular necrosis) ([facility] [address] 1/4/2011    CAD (coronary artery disease) 4/23/2014    Cancer of transverse colon ([facility] [address] 1/20/2011    surgery    Dyslipidemia 6/12/2014    Gastrointestinal disorder     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 Brenton Kuar HX HERNIA REPAIR      HX TONSILLECTOMY         Current Outpatient Medications   Medication Sig Dispense Refill    apixaban (ELIQUIS) 5 mg tablet Take 1 Tab by mouth two (2) times a day. 180 Tab 3    ramipril (ALTACE) 10 mg capsule Take 1 Cap by mouth daily.  90 Cap 3    metoprolol succinate (TOPROL-XL) 25 mg XL tablet TAKE 1 TABLET BY MOUTH EVERY DAY 90 Tab 3    simvastatin (ZOCOR) 20 mg tablet TAKE 1 TABLET BY MOUTH EVERY EVENING 90 Tab 3    hydroCHLOROthiazide (HYDRODIURIL) 25 mg tablet TAKE 1 TABLET BY MOUTH EVERY DAY 90 Tab 3    ferrous sulfate 325 mg (65 mg iron) tablet Take  by mouth Daily (before breakfast). No Known Allergies      Objective:    Visit Vitals  /90 (BP 1 Location: Left arm, BP Patient Position: Sitting)   Pulse 76   Temp 97.6 °F (36.4 °C) (Oral)   Resp 14   Ht 5' 5\" (1.651 m)   Wt 160 lb (72.6 kg)   SpO2 95%   BMI 26.63 kg/m²       Ra Rene is a 82 y.o. male who appears well and in no distress. He is awake, alert, and oriented. There is a large firm tumor on the left upper lip as noted a few days ago with a healing biopsy site. Assessment/Plan:  Advanced Basal Cell Carcinoma of the left upper lip, nose. Discussed with the patient that Benjaman Diana is mostly likely the best option for treatment versus surgery due to patient being on blood thinners, his age, residence most of the year. Also discussed that taking this medication should not react with other medications that he is currently taking. I highly recommended and encouraged that the patient take L-Carnitine to alleviate potential side effect of muscle cramps and also discussed other side effects. The patient will take Erivedge once a day unless side effects become daily or bothersome. Today, we discussed the patient access program for assistance in getting this medication. He filled out his patient form which I will fax today to start the process. I will keep him updated. The questions of the pt and his family friends were answered. Labs were ordered for baseline CBC, Compmet. I spent 20 minutes on care coordination, discussion of the medication, side effects and alternative treatments. This plan was reviewed with the patient and patient agrees. All questions were answered. This scribe documentation was reviewed by me and accurately reflects the examination and decisions made by me.

## 2019-11-01 NOTE — PROGRESS NOTES
I spoke w/ the pt in person today and his friend Michael Ma. Discussed the diagnosis. Plan is for him to start [address] when we can get access to the meds with pt assistance program. Labs ordered today.

## 2019-11-02 LAB
ALBUMIN SERPL-MCNC: 4.6 G/DL (ref 3.5–4.7)
ALBUMIN/GLOB SERPL: 1.5 {RATIO} (ref 1.2–2.2)
ALP SERPL-CCNC: 71 IU/L (ref 39–117)
ALT SERPL-CCNC: 13 IU/L (ref 0–44)
AST SERPL-CCNC: 26 IU/L (ref 0–40)
BASOPHILS # BLD AUTO: 0.1 X10E3/UL (ref 0–0.2)
BASOPHILS NFR BLD AUTO: 1 %
BILIRUB SERPL-MCNC: 1.1 MG/DL (ref 0–1.2)
BUN SERPL-MCNC: 29 MG/DL (ref 8–27)
BUN/CREAT SERPL: 22 (ref 10–24)
CALCIUM SERPL-MCNC: 9.6 MG/DL (ref 8.6–10.2)
CHLORIDE SERPL-SCNC: 99 MMOL/L (ref 96–106)
CO2 SERPL-SCNC: 26 MMOL/L (ref 20–29)
CREAT SERPL-MCNC: 1.34 MG/DL (ref 0.76–1.27)
EOSINOPHIL # BLD AUTO: 0.2 X10E3/UL (ref 0–0.4)
EOSINOPHIL NFR BLD AUTO: 2 %
ERYTHROCYTE [DISTWIDTH] IN BLOOD BY AUTOMATED COUNT: 12.3 % (ref 12.3–15.4)
GLOBULIN SER CALC-MCNC: 3.1 G/DL (ref 1.5–4.5)
GLUCOSE SERPL-MCNC: 104 MG/DL (ref 65–99)
HCT VFR BLD AUTO: 36.6 % (ref 37.5–51)
HGB BLD-MCNC: 12.3 G/DL (ref 13–17.7)
IMM GRANULOCYTES # BLD AUTO: 0 X10E3/UL (ref 0–0.1)
IMM GRANULOCYTES NFR BLD AUTO: 0 %
LYMPHOCYTES # BLD AUTO: 1.8 X10E3/UL (ref 0.7–3.1)
LYMPHOCYTES NFR BLD AUTO: 25 %
MCH RBC QN AUTO: 31.7 PG (ref 26.6–33)
MCHC RBC AUTO-ENTMCNC: 33.6 G/DL (ref 31.5–35.7)
MCV RBC AUTO: 94 FL (ref 79–97)
MONOCYTES # BLD AUTO: 0.6 X10E3/UL (ref 0.1–0.9)
MONOCYTES NFR BLD AUTO: 8 %
NEUTROPHILS # BLD AUTO: 4.6 X10E3/UL (ref 1.4–7)
NEUTROPHILS NFR BLD AUTO: 64 %
PLATELET # BLD AUTO: 240 X10E3/UL (ref 150–450)
POTASSIUM SERPL-SCNC: 4 MMOL/L (ref 3.5–5.2)
PROT SERPL-MCNC: 7.7 G/DL (ref 6–8.5)
RBC # BLD AUTO: 3.88 X10E6/UL (ref 4.14–5.8)
SODIUM SERPL-SCNC: 143 MMOL/L (ref 134–144)
WBC # BLD AUTO: 7.2 X10E3/UL (ref 3.4–10.8)

## 2019-11-04 NOTE — PROGRESS NOTES
I spoke with Varghese Barriga, his friend about his labs. Mildly anemic and kidney functions are mildly abnormal. Will forward labs to Cardiologist for their information. I suggested Varghese Barriga tell him to make sure he is drinking adequate fluids and then we will recheck his labs after he starts the [address].  He expressed understanding and knows that after he speaks with the pt  - they may contact me at any time with questions/ concerns

## 2019-11-12 ENCOUNTER — TELEPHONE (OUTPATIENT)
Dept: DERMATOLOGY | Facility: AMBULATORY SURGERY CENTER | Age: 84
End: 2019-11-12

## 2019-11-12 NOTE — TELEPHONE ENCOUNTER
I spoke w/ Lenchoa Neighbours and discussed that the Jjaa Bump was approved free of charge. He will monitor for the shipment of the medication and if not arrived by next Monday, contact our office. I suggest 2 month labs and then telemedicine evaluation. He will return in 3 months.

## 2019-12-30 ENCOUNTER — TELEPHONE (OUTPATIENT)
Dept: DERMATOLOGY | Facility: AMBULATORY SURGERY CENTER | Age: 84
End: 2019-12-30

## 2019-12-30 NOTE — TELEPHONE ENCOUNTER
I spoke w/ Corrinne Crater and he states Mr. Gerrie Leyden has been on the [address] for 2 weeks w/o side effects at this point. He is taking the L-Carnatine as well. I asked Isauro to call with concerns/ problems. Otherwise we will f/up in February as scheduled.

## 2020-01-14 DIAGNOSIS — I10 ESSENTIAL HYPERTENSION: ICD-10-CM

## 2020-01-14 DIAGNOSIS — I35.0 NONRHEUMATIC AORTIC (VALVE) STENOSIS: ICD-10-CM

## 2020-01-14 DIAGNOSIS — I48.11 LONGSTANDING PERSISTENT ATRIAL FIBRILLATION (HCC): Primary | ICD-10-CM

## 2020-01-14 DIAGNOSIS — E78.5 DYSLIPIDEMIA: ICD-10-CM

## 2020-01-14 DIAGNOSIS — I25.10 CORONARY ARTERY DISEASE INVOLVING NATIVE CORONARY ARTERY OF NATIVE HEART WITHOUT ANGINA PECTORIS: ICD-10-CM

## 2020-01-16 RX ORDER — METOPROLOL SUCCINATE 25 MG/1
25 TABLET, EXTENDED RELEASE ORAL DAILY
Qty: 90 TAB | Refills: 3 | Status: SHIPPED | OUTPATIENT
Start: 2020-01-16 | End: 2020-12-14

## 2020-01-16 RX ORDER — SIMVASTATIN 20 MG/1
20 TABLET, FILM COATED ORAL
Qty: 90 TAB | Refills: 3 | Status: SHIPPED | OUTPATIENT
Start: 2020-01-16 | End: 2020-12-14

## 2020-01-16 RX ORDER — HYDROCHLOROTHIAZIDE 25 MG/1
25 TABLET ORAL DAILY
Qty: 90 TAB | Refills: 3 | Status: SHIPPED | OUTPATIENT
Start: 2020-01-16 | End: 2021-03-02

## 2020-01-16 NOTE — TELEPHONE ENCOUNTER
Request for HCTZ 25mg daily, Toprol 25mg daily and simvastatin 20mg QHS. Last office visit 10-24-19, next office visit 4-24-20.  Refills per verbal order from Dr. Nanci Corea.

## 2020-02-13 ENCOUNTER — TELEPHONE (OUTPATIENT)
Dept: DERMATOLOGY | Facility: AMBULATORY SURGERY CENTER | Age: 85
End: 2020-02-13

## 2020-02-13 NOTE — TELEPHONE ENCOUNTER
I spoke w/ Dwain Headings and he sent me labs and photos to update on Mr. Stratton Blazing progress. The labs look fine to continue the Erivedge. The photo shows obvious improvement in the tumor size. We arranged an appt for 3/25 at 10am for further evaluation.

## 2020-03-11 DIAGNOSIS — I10 ESSENTIAL HYPERTENSION: ICD-10-CM

## 2020-03-11 RX ORDER — RAMIPRIL 10 MG/1
10 CAPSULE ORAL DAILY
Qty: 90 CAP | Refills: 3 | Status: SHIPPED | OUTPATIENT
Start: 2020-03-11 | End: 2021-03-02

## 2020-03-11 NOTE — TELEPHONE ENCOUNTER
Request for Ramipril 10mg daily. Last office visit 10-24-19, next office visit 3-20-20.  Refills per verbal order from Dr. Josh Pfeiffer.

## 2020-03-13 ENCOUNTER — TELEPHONE (OUTPATIENT)
Dept: DERMATOLOGY | Facility: AMBULATORY SURGERY CENTER | Age: 85
End: 2020-03-13

## 2020-03-30 ENCOUNTER — OFFICE VISIT (OUTPATIENT)
Dept: DERMATOLOGY | Facility: AMBULATORY SURGERY CENTER | Age: 85
End: 2020-03-30

## 2020-03-30 DIAGNOSIS — C44.01 BASAL CELL CARCINOMA (BCC) OF UPPER LIP: Primary | ICD-10-CM

## 2020-03-30 DIAGNOSIS — Z51.81 MEDICATION MONITORING ENCOUNTER: ICD-10-CM

## 2020-03-30 RX ORDER — VISMODEGIB 150 MG/1
CAPSULE ORAL
COMMUNITY
End: 2021-08-24 | Stop reason: SDUPTHER

## 2020-03-30 NOTE — PROGRESS NOTES
Name: Buddy Ordoñez       Age: 82 y.o. Date: 3/30/2020    Chief Complaint: Video visit follow up for [facility] of the left upper lip    Subjective:    HPI  Mr. Buddy Ordoñez is a 82 y.o. male currently receiving Erivedge daily [facility] the [facility] the [facility] upper cutaneous lip. He is currently in the [facility] and due to EWVEV-51, could not travel for his appt today. Therefore, video visit was arranged. The patient states he is doing well on the medication. He was also taking L-Carnatine for the prevention of muscle cramping, but has run out. He denies hair loss, weight loss, bad taste, nausea or muscle cramps. He states his hair is actually growing better since taking this medication. He states the hair on the left upper lip is returning and is dark in color. It seems to him that the medication is working \"from the nose down\" onto the lip. He reports the skin is softer in the area previously occupied by the tumor. The patient's pertinent skin history includes : BCC on the left upper lip    ROS: Constitutional: Negative.     Dermatological : positive for - tumor left upper lip      Social History     Socioeconomic History    Marital status: SINGLE     Spouse name: Not on file    Number of children: Not on file    Years of education: Not on file    Highest education level: Not on file   Occupational History    Not on file   Social Needs    Financial resource strain: Not on file    Food insecurity     Worry: Not on file     Inability: Not on file    Transportation needs     Medical: Not on file     Non-medical: Not on file   Tobacco Use    Smoking status: Never Smoker    Smokeless tobacco: Never Used   Substance and Sexual Activity    Alcohol use: No    Drug use: No    Sexual activity: Not on file   Lifestyle    Physical activity     Days per week: Not on file     Minutes per session: Not on file    Stress: Not on file   Relationships    Social connections     Talks on phone: Not on file     Gets together: Not on file     Attends [employer] service: Not on file     Active member of club or organization: Not on file     Attends meetings of clubs or organizations: Not on file     Relationship status: Not on file    Intimate partner violence     Fear of current or ex partner: Not on file     Emotionally abused: Not on file     Physically abused: Not on file     Forced sexual activity: Not on file   Other Topics Concern     Service Not Asked    Blood Transfusions No    Caffeine Concern No    Occupational Exposure No    Hobby Hazards Not Asked    Sleep Concern No    Stress Concern No    Weight Concern No    Special Diet Yes     Comment: low cholesterol diet    Back Care Yes    Exercise Yes     Comment: walking    Bike Helmet Not Asked    Seat Belt Yes    Self-Exams No   Social History Narrative    Not on file       Family History   Problem Relation Age of Onset    Breast Cancer Mother     Heart Attack Father        Past Medical History:   Diagnosis Date    Aortic stenosis 10/24/2014    ATN (acute tubular necrosis) ([facility] [address] 1/4/2011    CAD (coronary artery disease) 4/23/2014    Cancer of transverse colon ([facility] [address] 1/20/2011    surgery    Dyslipidemia 6/12/2014    Gastrointestinal disorder     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 Kelvin Allison HX HERNIA REPAIR      HX TONSILLECTOMY         Current Outpatient Medications   Medication Sig Dispense Refill    ramipriL (ALTACE) 10 mg capsule Take 1 Cap by mouth daily. 90 Cap 3    simvastatin (ZOCOR) 20 mg tablet Take 1 Tab by mouth nightly.  90 Tab 3    metoprolol succinate (TOPROL-XL) 25 mg XL tablet Take 1 Tab by mouth daily. 90 Tab 3    hydroCHLOROthiazide (HYDRODIURIL) 25 mg tablet Take 1 Tab by mouth daily. 90 Tab 3    apixaban (ELIQUIS) 5 mg tablet Take 1 Tab by mouth two (2) times a day. 180 Tab 3    ferrous sulfate 325 mg (65 mg iron) tablet Take  by mouth Daily (before breakfast). No Known Allergies      Objective: There were no vitals taken for this visit. Joaquin Moritz is a 82 y.o. male who appears well and in no distress. He is awake, alert, and oriented. A skin examination was performed including his face. There has been evident decrease in tumor size from initial visit and also subsequent photos that were sent to me. Hair is evident on the left upper lip as well. The left vermilion lip is still retracted on the left side. Assessment/Plan:  Basal cell carcinoma of the left upper lip. Doing well on the Erivedge. Encouraged he resume the L-Carnatine. I asked him to notify me of any increase in symptoms/ side effects. We will plan to do a Video Visit again in one month. We will likely get labs again at that visit. Hopefull to arrange an in person visit in June.       Next visit:  1 month

## 2020-04-29 ENCOUNTER — VIRTUAL VISIT (OUTPATIENT)
Dept: DERMATOLOGY | Facility: AMBULATORY SURGERY CENTER | Age: 85
End: 2020-04-29

## 2020-04-29 VITALS — HEIGHT: 65 IN | WEIGHT: 160 LBS | BODY MASS INDEX: 26.66 KG/M2

## 2020-04-29 DIAGNOSIS — C44.01 BASAL CELL CARCINOMA (BCC) OF UPPER LIP: Primary | ICD-10-CM

## 2020-04-29 NOTE — PROGRESS NOTES
The patient is evaluated by a video visit encounter for concerns as above. Patient identification was verified prior to start of the visit. A caregiver was present when appropriate. Due to this being a TeleHealth encounter (During DKZCE-53 public health emergency), evaluation of the following organ systems was limited. Pursuant to the emergency declaration under the [address] [place] [address] authority and the [employer] Act, this Virtual  Visit was conducted, with patient's (and/or legal guardian's) consent, to reduce the patient's risk of exposure to COVID-19 and provide necessary medical care.     Services were provided through a video and voice synchronous discussion virtually to substitute for in-person clinic visit.   Patient and provider were located at their individual homes/ office. Name: Lorenza Flores       Age: 82 y.o. Date: 4/29/2020    Chief Complaint:   Chief Complaint   Patient presents with    Basal Cell Carcinoma     follow up on Erivedge       Subjective:    HPI:  Mr.. Lorenza Flores is a 82 y.o. male who presents for the re- evaluation of a lesion on the left upper cutaneous lip. This lesion was diagnosed by me as a locally advanced BCC in October 2019. We explored options for treatment and he elected to use Erivedge to treat this tumor. He started Erivedge daily mid December and has been tolerating this well since. Today, he admits to some muscle cramping - about once a week that lasts a few minutes. He reports this does not impact his quality of life or daily activities. He also states the hair on the left side of his mustache is not growing back. He denies poor taste, lack of appetite or weight loss. He denies other perceived side effects. He reports the hard area on the left upper lip continues to soften and is smaller.     ROS: Consitutional: Negative  Dermatological : positive for - skin lesion changes  MS- muscle cramping      Social History     Socioeconomic History    Marital status: SINGLE     Spouse name: Not on file    Number of children: Not on file    Years of education: Not on file    Highest education level: Not on file   Occupational History    Not on file   Social Needs    Financial resource strain: Not on file    Food insecurity     Worry: Not on file     Inability: Not on file    Transportation needs     Medical: Not on file     Non-medical: Not on file   Tobacco Use    Smoking status: Never Smoker    Smokeless tobacco: Never Used   Substance and Sexual Activity    Alcohol use: No    Drug use: No    Sexual activity: Not on file   Lifestyle    Physical activity     Days per week: Not on file     Minutes per session: Not on file    Stress: Not on file   Relationships    Social connections     Talks on phone: Not on file     Gets together: Not on file     Attends [employer] service: Not on file     Active member of club or organization: Not on file     Attends meetings of clubs or organizations: Not on file     Relationship status: Not on file    Intimate partner violence     Fear of current or ex partner: Not on file     Emotionally abused: Not on file     Physically abused: Not on file     Forced sexual activity: Not on file   Other Topics Concern     Service Not Asked    Blood Transfusions No    Caffeine Concern No    Occupational Exposure No    Hobby Hazards Not Asked    Sleep Concern No    Stress Concern No    Weight Concern No    Special Diet Yes     Comment: low cholesterol diet    Back Care Yes    Exercise Yes     Comment: walking    Bike Helmet Not Asked    Seat Belt Yes    Self-Exams No   Social History Narrative    Not on file       Family History   Problem Relation Age of Onset    Breast Cancer Mother     Heart Attack Father        Past Medical History:   Diagnosis Date    Aortic stenosis 10/24/2014    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 Ethel Beasley HX HERNIA REPAIR      HX TONSILLECTOMY         Current Outpatient Medications   Medication Sig Dispense Refill    vismodegib (Erivedge) 150 mg cap Take  by mouth.  ramipriL (ALTACE) 10 mg capsule Take 1 Cap by mouth daily. 90 Cap 3    simvastatin (ZOCOR) 20 mg tablet Take 1 Tab by mouth nightly. 90 Tab 3    metoprolol succinate (TOPROL-XL) 25 mg XL tablet Take 1 Tab by mouth daily. 90 Tab 3    hydroCHLOROthiazide (HYDRODIURIL) 25 mg tablet Take 1 Tab by mouth daily. 90 Tab 3    apixaban (ELIQUIS) 5 mg tablet Take 1 Tab by mouth two (2) times a day. 180 Tab 3    ferrous sulfate 325 mg (65 mg iron) tablet Take  by mouth Daily (before breakfast). No Known Allergies      Objective:    Visit Vitals  Ht 5' 5\" (1.651 m)   Wt 72.6 kg (160 lb)   BMI 26.63 kg/m²       Raúl Christian is a 82 y.o. male who appears well and in no distress. He is awake, alert, and oriented. .    There is perceived decrease in tumor size since last virtual visit. He has retraction still present of the left upper vermilion lip and the left ala appears to have thinned out and appears retracted some as well. No obvious open wounds. There is no hair on the left side of his upper lip. Assessment/Plan:    1. Locally advanced BCC of the left upper lip - extending onto the nose. He will continue Erivedge for now.  He can not get labs at this point as the doctors office in his area are not open. He will continue the L-Carnatine for cramping and notify me if this worsens to the point he desires a pause in his medication. He will monitor for additional side effects and communicate prn. We discussed the fact the hair would likely not grow back on the left side of his upper lip. We discussed the closing of the [facility] practice and fact he could choose to stay with [facility] with medical oncologist, Dr. Pablo Zapata or transition to a private dermatologist. Discussed positives and negatives of both of these options. He elects to transition his care as of 5/29 to Dr. Faith Bashir. I provided the patient and his son, Monica Awan, with the phone number to make an appointment for early June for virtual visit and hopefully he can be seen sometime soon there after in person. Isauro agreed to call an make that appointment. They both know they can contact me for any assistance in transition of care or for any questions until that date.       Next skin exam:  1 month

## 2020-07-17 DIAGNOSIS — I48.19 PERSISTENT ATRIAL FIBRILLATION (HCC): ICD-10-CM

## 2020-07-17 NOTE — TELEPHONE ENCOUNTER
Cardiologist: Dr. Tyesha Hutson    Last appt: 4/17/2019  No future appointments. Requested Prescriptions     Signed Prescriptions Disp Refills    apixaban (Eliquis) 5 mg tablet 180 Tab 3     Sig: Take 1 Tab by mouth two (2) times a day.      Authorizing Provider: Howie Organ     Ordering User: Trudy Phillip         Refills vo per Dr. Tyesha Hutson

## 2020-12-07 DIAGNOSIS — E78.5 DYSLIPIDEMIA: ICD-10-CM

## 2020-12-07 DIAGNOSIS — I25.10 CORONARY ARTERY DISEASE INVOLVING NATIVE CORONARY ARTERY OF NATIVE HEART WITHOUT ANGINA PECTORIS: ICD-10-CM

## 2020-12-07 DIAGNOSIS — I48.11 LONGSTANDING PERSISTENT ATRIAL FIBRILLATION (HCC): ICD-10-CM

## 2020-12-14 RX ORDER — METOPROLOL SUCCINATE 25 MG/1
25 TABLET, EXTENDED RELEASE ORAL DAILY
Qty: 90 TAB | Refills: 0 | Status: SHIPPED | OUTPATIENT
Start: 2020-12-14 | End: 2021-03-02

## 2020-12-14 RX ORDER — SIMVASTATIN 20 MG/1
20 TABLET, FILM COATED ORAL
Qty: 90 TAB | Refills: 0 | Status: SHIPPED | OUTPATIENT
Start: 2020-12-14 | End: 2021-03-02

## 2021-02-26 DIAGNOSIS — I48.11 LONGSTANDING PERSISTENT ATRIAL FIBRILLATION (HCC): ICD-10-CM

## 2021-02-26 DIAGNOSIS — I25.10 CORONARY ARTERY DISEASE INVOLVING NATIVE CORONARY ARTERY OF NATIVE HEART WITHOUT ANGINA PECTORIS: ICD-10-CM

## 2021-02-26 DIAGNOSIS — I10 ESSENTIAL HYPERTENSION: ICD-10-CM

## 2021-02-26 DIAGNOSIS — E78.5 DYSLIPIDEMIA: ICD-10-CM

## 2021-03-02 RX ORDER — SIMVASTATIN 20 MG/1
20 TABLET, FILM COATED ORAL
Qty: 30 TAB | Refills: 0 | Status: SHIPPED | OUTPATIENT
Start: 2021-03-02 | End: 2021-03-04 | Stop reason: SDUPTHER

## 2021-03-02 RX ORDER — RAMIPRIL 10 MG/1
10 CAPSULE ORAL DAILY
Qty: 30 CAP | Refills: 0 | Status: SHIPPED | OUTPATIENT
Start: 2021-03-02 | End: 2021-03-04 | Stop reason: SDUPTHER

## 2021-03-02 RX ORDER — METOPROLOL SUCCINATE 25 MG/1
25 TABLET, EXTENDED RELEASE ORAL DAILY
Qty: 30 TAB | Refills: 0 | Status: SHIPPED | OUTPATIENT
Start: 2021-03-02 | End: 2021-03-04 | Stop reason: SDUPTHER

## 2021-03-02 RX ORDER — HYDROCHLOROTHIAZIDE 25 MG/1
25 TABLET ORAL DAILY
Qty: 30 TAB | Refills: 0 | Status: SHIPPED | OUTPATIENT
Start: 2021-03-02 | End: 2021-03-04 | Stop reason: SDUPTHER

## 2021-03-04 ENCOUNTER — TELEPHONE (OUTPATIENT)
Dept: CARDIOLOGY CLINIC | Age: 86
End: 2021-03-04

## 2021-03-04 DIAGNOSIS — E78.5 DYSLIPIDEMIA: ICD-10-CM

## 2021-03-04 DIAGNOSIS — I48.11 LONGSTANDING PERSISTENT ATRIAL FIBRILLATION (HCC): ICD-10-CM

## 2021-03-04 DIAGNOSIS — I10 ESSENTIAL HYPERTENSION: ICD-10-CM

## 2021-03-04 DIAGNOSIS — I25.10 CORONARY ARTERY DISEASE INVOLVING NATIVE CORONARY ARTERY OF NATIVE HEART WITHOUT ANGINA PECTORIS: ICD-10-CM

## 2021-03-04 DIAGNOSIS — I48.19 PERSISTENT ATRIAL FIBRILLATION (HCC): ICD-10-CM

## 2021-03-04 RX ORDER — RAMIPRIL 10 MG/1
10 CAPSULE ORAL DAILY
Qty: 90 CAP | Refills: 3 | Status: SHIPPED | OUTPATIENT
Start: 2021-03-04 | End: 2021-03-09 | Stop reason: SDUPTHER

## 2021-03-04 RX ORDER — SIMVASTATIN 20 MG/1
20 TABLET, FILM COATED ORAL
Qty: 90 TAB | Refills: 3 | Status: SHIPPED | OUTPATIENT
Start: 2021-03-04 | End: 2021-03-09 | Stop reason: SDUPTHER

## 2021-03-04 RX ORDER — HYDROCHLOROTHIAZIDE 25 MG/1
25 TABLET ORAL DAILY
Qty: 90 TAB | Refills: 3 | Status: SHIPPED | OUTPATIENT
Start: 2021-03-04 | End: 2021-03-09 | Stop reason: SDUPTHER

## 2021-03-04 RX ORDER — METOPROLOL SUCCINATE 25 MG/1
25 TABLET, EXTENDED RELEASE ORAL DAILY
Qty: 90 TAB | Refills: 3 | Status: SHIPPED | OUTPATIENT
Start: 2021-03-04 | End: 2021-03-09 | Stop reason: SDUPTHER

## 2021-03-04 NOTE — TELEPHONE ENCOUNTER
Patient's son would like to discuss how he can arrange a VV with Dr. Rhys Moe for patient. He states he is aware that Dr. Rhys Moe uses [employer] but patient does not have an iPhone. He states patient does use [employer] that works well, but he doesn't believe the little bit of Internet that he has would work with the [employer]. He states due to pandemic, patient has not been able to return to the [place] Please advise.      Phone: [phone]

## 2021-03-05 NOTE — TELEPHONE ENCOUNTER
Patient's caretaker, Vaishail Handler, stated that the patient is in the [facility] and unable to return home due to the pandemic. He is calling to see if it is safe for the patient to get the COVID vaccine over there. He states that they are not using the same vaccines as we are, they are using \"chinese\" and \"Peruvian\" vaccines. Please advise.     Phone #: [phone]  Thanks

## 2021-03-07 NOTE — TELEPHONE ENCOUNTER
Yes would seem best alternative  Most vaccines operate on same principle of antibody to spike protein and high chance all of us will need booster or a \"second generation\" vaccine down the road  Better than getting Covid [place]

## 2021-03-09 RX ORDER — RAMIPRIL 10 MG/1
10 CAPSULE ORAL DAILY
Qty: 90 CAP | Refills: 3 | Status: SHIPPED | OUTPATIENT
Start: 2021-03-09 | End: 2021-05-20 | Stop reason: SDUPTHER

## 2021-03-09 RX ORDER — METOPROLOL SUCCINATE 25 MG/1
25 TABLET, EXTENDED RELEASE ORAL DAILY
Qty: 90 TAB | Refills: 3 | Status: SHIPPED | OUTPATIENT
Start: 2021-03-09 | End: 2021-05-20 | Stop reason: SDUPTHER

## 2021-03-09 RX ORDER — HYDROCHLOROTHIAZIDE 25 MG/1
25 TABLET ORAL DAILY
Qty: 90 TAB | Refills: 3 | Status: SHIPPED | OUTPATIENT
Start: 2021-03-09 | End: 2021-05-06

## 2021-03-09 RX ORDER — SIMVASTATIN 20 MG/1
20 TABLET, FILM COATED ORAL
Qty: 90 TAB | Refills: 3 | Status: SHIPPED | OUTPATIENT
Start: 2021-03-09 | End: 2021-05-20 | Stop reason: SDUPTHER

## 2021-03-09 NOTE — TELEPHONE ENCOUNTER
Verified patient with two types of identifiers. Spoke to patient's son (on PHI) and he requested refills of all his father's medications. Resent them all per VO by MD. Also recommended he get the COVID vaccine if he can in the [place]. Patient's son verbalized understanding and will call with any other questions.

## 2021-05-06 ENCOUNTER — OFFICE VISIT (OUTPATIENT)
Dept: CARDIOLOGY CLINIC | Age: 86
End: 2021-05-06

## 2021-05-06 VITALS
BODY MASS INDEX: 24.91 KG/M2 | DIASTOLIC BLOOD PRESSURE: 70 MMHG | SYSTOLIC BLOOD PRESSURE: 140 MMHG | HEART RATE: 68 BPM | WEIGHT: 155 LBS | RESPIRATION RATE: 18 BRPM | HEIGHT: 66 IN | OXYGEN SATURATION: 98 %

## 2021-05-06 DIAGNOSIS — I25.10 CORONARY ARTERY DISEASE INVOLVING NATIVE CORONARY ARTERY OF NATIVE HEART WITHOUT ANGINA PECTORIS: Primary | ICD-10-CM

## 2021-05-06 DIAGNOSIS — J34.89 NASAL MASS: ICD-10-CM

## 2021-05-06 DIAGNOSIS — I48.19 PERSISTENT ATRIAL FIBRILLATION (HCC): ICD-10-CM

## 2021-05-06 DIAGNOSIS — I10 ESSENTIAL HYPERTENSION: ICD-10-CM

## 2021-05-06 DIAGNOSIS — I35.0 NONRHEUMATIC AORTIC VALVE STENOSIS: ICD-10-CM

## 2021-05-06 DIAGNOSIS — E78.5 DYSLIPIDEMIA: ICD-10-CM

## 2021-05-06 PROCEDURE — 93000 ELECTROCARDIOGRAM COMPLETE: CPT | Performed by: SPECIALIST

## 2021-05-06 PROCEDURE — G8427 DOCREV CUR MEDS BY ELIG CLIN: HCPCS | Performed by: SPECIALIST

## 2021-05-06 PROCEDURE — G8510 SCR DEP NEG, NO PLAN REQD: HCPCS | Performed by: SPECIALIST

## 2021-05-06 PROCEDURE — 99213 OFFICE O/P EST LOW 20 MIN: CPT | Performed by: SPECIALIST

## 2021-05-06 PROCEDURE — 1101F PT FALLS ASSESS-DOCD LE1/YR: CPT | Performed by: SPECIALIST

## 2021-05-06 PROCEDURE — G8419 CALC BMI OUT NRM PARAM NOF/U: HCPCS | Performed by: SPECIALIST

## 2021-05-06 PROCEDURE — G8536 NO DOC ELDER MAL SCRN: HCPCS | Performed by: SPECIALIST

## 2021-05-06 PROCEDURE — G8753 SYS BP > OR = 140: HCPCS | Performed by: SPECIALIST

## 2021-05-06 PROCEDURE — G8754 DIAS BP LESS 90: HCPCS | Performed by: SPECIALIST

## 2021-05-06 RX ORDER — HYDROCHLOROTHIAZIDE 50 MG/1
50 TABLET ORAL DAILY
Qty: 90 TAB | Refills: 1 | Status: SHIPPED | OUTPATIENT
Start: 2021-05-06 | End: 2021-05-20 | Stop reason: SDUPTHER

## 2021-05-06 NOTE — Clinical Note
5/6/2021 Patient: Nubia Ordaz YOB: 1935 Date of Visit: 5/6/2021 Sherry Pablo MD 
[address] 
[phone] [facility] [address] Fax: [phone] Dear Sherry Pablo MD, Thank you for referring Mr. Nubia Ordaz to [facility] for evaluation. My notes for this consultation are attached. If you have questions, please do not hesitate to call me. I look forward to following your patient along with you.  
 
 
Sincerely, 
 
Montse Pelletier MD

## 2021-05-06 NOTE — PROGRESS NOTES
Celso Jc     1935       Brandon Caldwell [place], [facility] [place]  Date of Visit-5/6/2021   PCP is None   [facility] [place]  [facility]  HPI:  Celso Jc is a 82 y.o. male   F/u from 10/24/19. Hx of CAD and AF. Prior CABG. Lives in [facility]. Pt is accompanied by a friend. Overall the pt states he is doing well. He states his LE edema is unchanged. He reports frequent urination on the diuretic. He notes that the mass on his face is smaller but his lip seems to look worse. He ran out of ErGraphenea. He has not been checking his BP at home. He had blood work November 2020 in [facility]. Labs 11/2/20 show creatinine 1.1, BUN 20, glucose 84, sodium 140, potassium 4.0, HGB 10.8. He's gotten the first COVID shot. He is leaving to go back to the [facility] on May 28. Denies chest pain, edema, syncope or shortness of breath at rest, has no tachycardia, palpitations or sense of arrhythmia. EKG: Atrial flutter-fibrillation   -Left axis. -Nonspecific ST depression  -Nondiagnostic. Assessment/Plan:     1. Coronary artery disease involving native coronary artery of native heart without angina pectoris  S/p CABG. No angina. CABG Off pump 4/25/14 LIMA TO LAD, SVG TO DIAG, SVG TO OM2  - Continue BB and Statin.     CATH 4/23/14 complex distal lm, ostial lcx, non dominant rca. Normal lvef  Stable continue medical management  - AMB POC EKG ROUTINE W/ 12 LEADS, INTER & REP    2. Persistent atrial fibrillation ([address]  Remains with good rate control on Eliquis with no bleeding issues. 3. Essential hypertension  As compared to last visit his dwelling is increased. I will increase HCTZ to 50 mg daily. Will get an echo in a few weeks and see his response to the change in meds. This should also help the BP.    At goal , meds and possible side effects reviewed and patient denies  Key CAD CHF Meds             hydroCHLOROthiazide (HYDRODIURIL) 50 mg tablet (Taking/Discontinued) Take 1 Tab by mouth daily. apixaban (Eliquis) 5 mg tablet (Taking/Discontinued) Take 1 Tab by mouth two (2) times a day. simvastatin (ZOCOR) 20 mg tablet (Taking/Discontinued) Take 1 Tab by mouth nightly. metoprolol succinate (TOPROL-XL) 25 mg XL tablet (Taking/Discontinued) Take 1 Tab by mouth daily. ramipriL (ALTACE) 10 mg capsule (Taking/Discontinued) Take 1 Cap by mouth daily. BP Readings from Last 6 Encounters:   05/06/21 (!) 140/70   11/01/19 158/90   10/30/19 190/82   10/24/19 197/90   04/17/19 120/60   10/18/18 160/75        4. Dyslipidemia  On simvastatin. LDL's have been at goal.   At goal , denies excess muscle aches or new liver issues  Key Antihyperlipidemia Meds             simvastatin (ZOCOR) 20 mg tablet (Taking/Discontinued) Take 1 Tab by mouth nightly. Lab Results   Component Value Date/Time    LDL, calculated 85 05/31/2017 12:10 PM       5. Nonrheumatic aortic valve stenosis  With systolic murmur. Repeat echo. 6. Nasal mass  He saw dermatology and had a resection. Since there is no more derm at [facility] they tried to get him into oncology. There was some issue because of COVID and technology. I will see if Dr. Jaskaran Dahl group can see him. F/u in 2 weeks  Future Appointments   Date Time Provider [place]   5/24/2021  9:40 AM Brandon Grey MD [facility] BS AMB   5/27/2021  2:00 PM Rosa Elena Cooper  [facility] BS AMB         Impression:   1. Coronary artery disease involving native coronary artery of native heart without angina pectoris    2. Persistent atrial fibrillation ([address]    3. Essential hypertension    4. Dyslipidemia    5. Nonrheumatic aortic valve stenosis    6. Nasal mass       Cardiac History:      CABG Off pump 4/25/14 LIMA TO LAD, SVG TO DIAG, SVG TO OM2    CATH 4/23/14 complex distal lm, ostial lcx, non dominant rca. Normal lvef    TTE 11/2012 LVEF 55 % to 60 %. No RWMA. LVH, DD. JESS. MAC, mild to mod MR, mild ASc.  Mild TR.  Medical history: Includes colon cancer in . Cranial surgeries in  and . Fractured vertebra in . Holter monitor 10/7/14 rhythm atrial fibrillation, PVCs, rate 34-89, 28 pauses up to 3.2 seconds, no PACs, frequent PVCs, 9.85% of all beats at 9,471 with some periods of ventricular bigeminy and trigeminy. Social history: Nonsmoker. No alcohol. Drinks one cup of coffee a day. Single. No children. Enjoys painting, reading, and is retired. Family history: Mother  of breast cancer at 72. Father  of heart attack at 54. SOLO 14=EF 55 % to 60 %. Moderate LAE with \"smoke\"   Left atrial appendage: The appendage was mildly dilated. There was a probable, medium-sized, regular, solid mass, measuring 11 mm x 9 mm in the  body of the appendage. There was moderate continuous spontaneous echo contrast (\"smoke\") in the appendage. Small PFO, moderate JARON,moderate MR; mild TR  Right atrium: The atrium was moderately dilated. Aortic valve: Systolic area of 2.1 cm squared by planimetry. mild atheroma in the mid descending  Due to thrombus in LA appendage, the cardioversion is cancelled. No future appointments. ROS-except as noted above. . A complete cardiac and respiratory are reviewed and negative except as above ; Resp-denies wheezing  or productive cough,.  Const- No unusual weight loss or fever; Neuro-no recent seizure or CVA ; GI- No BRBPR, abdom pain, bloating ; - no  hematuria   see supplement sheet, initialed and to be scanned by staff  Past Medical History:   Diagnosis Date    Aortic stenosis 10/24/2014    [address] [facility] necrosis) [address] [address] 2011    CAD (coronary artery disease) 2014    Cancer of transverse colon ([facility] 2011    surgery    Dyslipidemia 2014    Gastrointestinal disorder     Heart bloc atrioventricular 2014    Hypertension     Junctional rhythm 1/3/2011    PAF (paroxysmal atrial fibrillation) (HCC)     PVC's (premature ventricular contractions) 9/8/2014    S/P CABG x 3 4/28/2014    Skin cancer       Social Hx= reports that he has never smoked. He has never used smokeless tobacco. He reports that he does not drink alcohol or use drugs. Exam and Labs:  BP (!) 140/70 (BP 1 Location: Left arm, BP Patient Position: Sitting, BP Cuff Size: Adult)   Pulse 68   Resp 18   Ht 5' 6\" (1.676 m)   Wt 155 lb (70.3 kg)   SpO2 98%   BMI 25.02 kg/m² Constitutional:  NAD, comfortable  Head: NC,AT. Eyes: No scleral icterus. Neck:  Neck supple. No JVD present. Throat: moist mucous membranes. Chest: Effort normal & normal respiratory excursion . Neurological: alert, conversant and oriented . Skin: Skin is not cold. No obvious systemic rash noted. Not diaphoretic. No erythema. Psychiatric:  Grossly normal mood and affect. Behavior appears normal. Extremities:  no clubbing or cyanosis. Abdomen: non distended    Lungs:breath sounds normal. No stridor. distress, wheezes or  Rales. IOKDV:7/2 short systolic murmur normal rate, regular rhythm, normal S1, S2, no rubs, clicks or gallops , PMI non displaced.     Edema: Edema is 2+ to the knee bilateral.  Lab Results   Component Value Date/Time    Cholesterol, total 158 05/31/2017 12:10 PM    HDL Cholesterol 63 05/31/2017 12:10 PM    LDL, calculated 85 05/31/2017 12:10 PM    Triglyceride 52 05/31/2017 12:10 PM    CHOL/HDL Ratio 3.7 04/24/2014 06:08 AM     Lab Results   Component Value Date/Time    Sodium 143 11/01/2019 12:04 PM    Potassium 4.0 11/01/2019 12:04 PM    Chloride 99 11/01/2019 12:04 PM    CO2 26 11/01/2019 12:04 PM    Anion gap 6 11/07/2014 10:01 AM    Glucose 104 (H) 11/01/2019 12:04 PM    BUN 29 (H) 11/01/2019 12:04 PM    Creatinine 1.34 (H) 11/01/2019 12:04 PM    BUN/Creatinine ratio 22 11/01/2019 12:04 PM    GFR est AA 57 (L) 11/01/2019 12:04 PM    GFR est non-AA 49 (L) 11/01/2019 12:04 PM    Calcium 9.6 11/01/2019 12:04 PM      Wt Readings from Last 3 Encounters:   05/06/21 155 lb (70.3 kg) 04/29/20 160 lb (72.6 kg)   11/01/19 160 lb (72.6 kg)      BP Readings from Last 3 Encounters:   05/06/21 (!) 140/70   11/01/19 158/90   10/30/19 190/82      Current Outpatient Medications   Medication Sig    hydroCHLOROthiazide (HYDRODIURIL) 50 mg tablet Take 1 Tab by mouth daily.  apixaban (Eliquis) 5 mg tablet Take 1 Tab by mouth two (2) times a day.  simvastatin (ZOCOR) 20 mg tablet Take 1 Tab by mouth nightly.  metoprolol succinate (TOPROL-XL) 25 mg XL tablet Take 1 Tab by mouth daily.  ramipriL (ALTACE) 10 mg capsule Take 1 Cap by mouth daily.  vismodegib (Erivedge) 150 mg cap Take  by mouth.  ferrous sulfate 325 mg (65 mg iron) tablet Take  by mouth Daily (before breakfast). No current facility-administered medications for this visit. Impression see above.       Written by Chris Neff, as dictated by Maria L Mora MD.

## 2021-05-20 DIAGNOSIS — I48.19 PERSISTENT ATRIAL FIBRILLATION (HCC): ICD-10-CM

## 2021-05-20 DIAGNOSIS — I25.10 CORONARY ARTERY DISEASE INVOLVING NATIVE CORONARY ARTERY OF NATIVE HEART WITHOUT ANGINA PECTORIS: ICD-10-CM

## 2021-05-20 DIAGNOSIS — E78.5 DYSLIPIDEMIA: ICD-10-CM

## 2021-05-20 DIAGNOSIS — I10 ESSENTIAL HYPERTENSION: ICD-10-CM

## 2021-05-20 DIAGNOSIS — I48.11 LONGSTANDING PERSISTENT ATRIAL FIBRILLATION (HCC): ICD-10-CM

## 2021-05-20 RX ORDER — SIMVASTATIN 20 MG/1
20 TABLET, FILM COATED ORAL
Qty: 90 TABLET | Refills: 3 | Status: SHIPPED | OUTPATIENT
Start: 2021-05-20

## 2021-05-20 RX ORDER — METOPROLOL SUCCINATE 25 MG/1
25 TABLET, EXTENDED RELEASE ORAL DAILY
Qty: 90 TABLET | Refills: 3 | Status: SHIPPED | OUTPATIENT
Start: 2021-05-20

## 2021-05-20 RX ORDER — HYDROCHLOROTHIAZIDE 50 MG/1
50 TABLET ORAL DAILY
Qty: 90 TABLET | Refills: 3 | Status: SHIPPED | OUTPATIENT
Start: 2021-05-20

## 2021-05-20 RX ORDER — RAMIPRIL 10 MG/1
10 CAPSULE ORAL DAILY
Qty: 90 CAPSULE | Refills: 3 | Status: SHIPPED | OUTPATIENT
Start: 2021-05-20

## 2021-05-21 ENCOUNTER — ANCILLARY PROCEDURE (OUTPATIENT)
Dept: CARDIOLOGY CLINIC | Age: 86
End: 2021-05-21

## 2021-05-21 VITALS — WEIGHT: 155 LBS | HEIGHT: 66 IN | BODY MASS INDEX: 24.91 KG/M2

## 2021-05-21 DIAGNOSIS — I48.19 PERSISTENT ATRIAL FIBRILLATION (HCC): ICD-10-CM

## 2021-05-21 DIAGNOSIS — I35.0 NONRHEUMATIC AORTIC VALVE STENOSIS: ICD-10-CM

## 2021-05-21 PROCEDURE — 93306 TTE W/DOPPLER COMPLETE: CPT | Performed by: SPECIALIST

## 2021-05-24 ENCOUNTER — OFFICE VISIT (OUTPATIENT)
Dept: CARDIOLOGY CLINIC | Age: 86
End: 2021-05-24

## 2021-05-24 VITALS
BODY MASS INDEX: 25.26 KG/M2 | HEART RATE: 69 BPM | HEIGHT: 66 IN | SYSTOLIC BLOOD PRESSURE: 120 MMHG | OXYGEN SATURATION: 99 % | WEIGHT: 157.2 LBS | DIASTOLIC BLOOD PRESSURE: 60 MMHG | RESPIRATION RATE: 16 BRPM

## 2021-05-24 DIAGNOSIS — I10 ESSENTIAL HYPERTENSION: ICD-10-CM

## 2021-05-24 DIAGNOSIS — E78.5 DYSLIPIDEMIA: ICD-10-CM

## 2021-05-24 DIAGNOSIS — I25.10 CORONARY ARTERY DISEASE INVOLVING NATIVE CORONARY ARTERY OF NATIVE HEART WITHOUT ANGINA PECTORIS: ICD-10-CM

## 2021-05-24 DIAGNOSIS — J34.89 NASAL MASS: ICD-10-CM

## 2021-05-24 DIAGNOSIS — I35.0 NONRHEUMATIC AORTIC VALVE STENOSIS: Primary | ICD-10-CM

## 2021-05-24 DIAGNOSIS — I48.19 PERSISTENT ATRIAL FIBRILLATION (HCC): ICD-10-CM

## 2021-05-24 LAB
AV R PG: 80.23 MMHG
ECHO AO ASC DIAM: 3.43 CM
ECHO AO ROOT DIAM: 3.17 CM
ECHO AR MAX VEL PISA: 447.24 CM/S
ECHO AV AREA PEAK VELOCITY: 1 CM2
ECHO AV AREA VTI: 0.96 CM2
ECHO AV AREA/BSA PEAK VELOCITY: 0.6 CM2/M2
ECHO AV AREA/BSA VTI: 0.5 CM2/M2
ECHO AV MEAN GRADIENT: 18.42 MMHG
ECHO AV PEAK GRADIENT: 33.6 MMHG
ECHO AV PEAK VELOCITY: 289.83 CM/S
ECHO AV REGURGITANT PHT: 470.02 MS
ECHO AV VTI: 66.57 CM
ECHO IVC PROX: 2.71 CM
ECHO LA AREA 4C: 33.11 CM2
ECHO LA MAJOR AXIS: 5.55 CM
ECHO LA MINOR AXIS: 3.1 CM
ECHO LA VOL 2C: 150.77 ML (ref 18–58)
ECHO LA VOL 4C: 114.58 ML (ref 18–58)
ECHO LA VOL BP: 144.51 ML (ref 18–58)
ECHO LA VOL/BSA BIPLANE: 80.73 ML/M2 (ref 16–28)
ECHO LA VOLUME INDEX A2C: 84.23 ML/M2 (ref 16–28)
ECHO LA VOLUME INDEX A4C: 64.01 ML/M2 (ref 16–28)
ECHO LV EDV A2C: 131.39 ML
ECHO LV EDV A4C: 87.96 ML
ECHO LV EDV BP: 108.44 ML (ref 67–155)
ECHO LV EDV INDEX A4C: 49.1 ML/M2
ECHO LV EDV INDEX BP: 60.6 ML/M2
ECHO LV EDV NDEX A2C: 73.4 ML/M2
ECHO LV EJECTION FRACTION A2C: 68 PERCENT
ECHO LV EJECTION FRACTION A4C: 48 PERCENT
ECHO LV EJECTION FRACTION BIPLANE: 58.9 PERCENT (ref 55–100)
ECHO LV ESV A2C: 42.44 ML
ECHO LV ESV A4C: 45.93 ML
ECHO LV ESV BP: 44.6 ML (ref 22–58)
ECHO LV ESV INDEX A2C: 23.7 ML/M2
ECHO LV ESV INDEX A4C: 25.7 ML/M2
ECHO LV ESV INDEX BP: 24.9 ML/M2
ECHO LV INTERNAL DIMENSION DIASTOLIC: 5.13 CM (ref 4.2–5.9)
ECHO LV INTERNAL DIMENSION SYSTOLIC: 3.57 CM
ECHO LV IVSD: 1.46 CM (ref 0.6–1)
ECHO LV MASS 2D: 333.7 G (ref 88–224)
ECHO LV MASS INDEX 2D: 186.4 G/M2 (ref 49–115)
ECHO LV POSTERIOR WALL DIASTOLIC: 1.53 CM (ref 0.6–1)
ECHO LVOT DIAM: 2.23 CM
ECHO LVOT PEAK GRADIENT: 2.21 MMHG
ECHO LVOT PEAK VELOCITY: 74.33 CM/S
ECHO LVOT SV: 64.2 ML
ECHO LVOT VTI: 16.41 CM
ECHO PV MAX VELOCITY: 86.59 CM/S
ECHO PV PEAK INSTANTANEOUS GRADIENT SYSTOLIC: 3 MMHG
ECHO PV REGURGITANT MAX VELOCITY: 142.48 CM/S
ECHO RA MINOR AXIS: 4.08 CM
ECHO RV INTERNAL DIMENSION: 3.95 CM
ECHO RV TAPSE: 1.42 CM (ref 1.5–2)
ECHO TV REGURGITANT MAX VELOCITY: 363.32 CM/S
ECHO TV REGURGITANT PEAK GRADIENT: 52.8 MMHG
LA VOL DISK BP: 135.26 ML (ref 18–58)

## 2021-05-24 PROCEDURE — 93000 ELECTROCARDIOGRAM COMPLETE: CPT | Performed by: SPECIALIST

## 2021-05-24 RX ORDER — BUMETANIDE 1 MG/1
1 TABLET ORAL AS NEEDED
Qty: 36 TABLET | Refills: 3 | Status: SHIPPED | OUTPATIENT
Start: 2021-05-24

## 2021-05-24 NOTE — PATIENT INSTRUCTIONS
You may take Bumex 1mg up to three times a week as needed. Please have a basic metabolic panel completed in about a month. We will see you back for an annual follow up with an echocardiogram about a week prior.

## 2021-05-24 NOTE — PROGRESS NOTES
Jinger Ahumada     1935       Brandon Decker MD, [facility] [place]  Date of Visit-5/24/2021   PCP is None   [facility] [place]  [facility]  HPI:  Jinger Ahumada is a 82 y.o. male   3 week f/u. Hx of CAD and AF. Prior CABG. Pt with no angina when seen a few weeks ago. He is here to follow up on his testing. He appears to have more severe aortic stenosis. Pt had been out of some of his medicines and they have been restart. His swelling had increased some, so I added more HCTZ at 50 mg. Weight is unchanged  Pt presents with his friend, Anabela Ellis, from the [employer] whom he visits when he is in [place]. Overall the pt states he is doing well. He endorses some swelling. Son notes that pt's stamina has been down recently. They are concerned about the lesion on his nose and refilling the medicine started by outpatient dermatology. [facility] no longer has dermatologist will refer him to oncology which she was not able to see at last time he was in town  Denies chest pain, syncope or shortness of breath at rest, has no tachycardia, palpitations or sense of arrhythmia. EKG: Atrial fibrillation Rate is 69  QRS 98  Assessment/Plan:     Patient Instructions   You may take Bumex 1mg up to three times a week as needed. Please have a basic metabolic panel completed in about a month. We will see you back for an annual follow up with an echocardiogram about a week prior. Future Appointments   Date Time Provider [place]   7/27/2021  8:45 AM Hyacinth Piedra  N Vandana Bourgeois BS AMB   5/23/2022 10:00 AM NII LEYVA BS AMB   5/27/2022 10:00 AM Brandon Grey MD [facility] BS AMB       1. Nonrheumatic aortic valve stenosis  05/21/21    ECHO ADULT COMPLETE 05/24/2021 5/24/2021    Interpretation Summary  · LV: Estimated LVEF is 55 - 60%. Biplane method used to measure ejection fraction. Normal cavity size and systolic function (ejection fraction normal).  Moderate concentric hypertrophy. Wall motion: normal.  · RV: Reduced systolic function. · AV: Aortic valve leaflet calcification present. Aortic valve mean gradient is 18 mmHg. Aortic valve area is 1 cm2. Moderate to severe aortic valve stenosis is present. · MV: Mitral valve non-specific thickening. Mitral annular calcification. Moderate mitral valve regurgitation is present. · TV: Moderate tricuspid valve regurgitation is present. Right Ventricular Arterial Pressure (RVSP) is 62 mmHg. Pulmonary hypertension found to be moderate. · LA: Severely dilated left atrium. Left Atrium volume index is 80 mL/m2. · RA: Moderately dilated right atrium. · IVC: Severely elevated central venous pressure (15 mmHg); IVC diameter is larger than 21 mm and collapses less than 50% with respiration. Signed by: Zulema Morocho MD on 5/24/2021 10:16 AM    He is more fatigued with exertion compared to before. We discuss his AS as moderate by gradient and not at the point that he needs a TAVR and we will need annual echo follow up. He does have significant edema. Due to the increased edema I will add Bumex 1 mg 3 times a week. He can add the dosing and he will get a BMP in a month at home. 2. Coronary artery disease involving native coronary artery of native heart without angina pectoris  S/p CABG. No angina.   CABG Off pump 4/25/14 LIMA TO LAD, SVG TO DIAG, SVG TO OM2  - Continue BB and Statin.     CATH 4/23/14 complex distal lm, ostial lcx, non dominant rca. Normal lvef  Stable continue medical management      3. Persistent atrial fibrillation (HCC)  On Eliquis, good rate control, no bleeding    4. Essential hypertension  BP improved with HCTZ  At goal , meds and possible side effects reviewed and patient denies  Key CAD CHF Meds             bumetanide (BUMEX) 1 mg tablet (Taking) Take 1 Tablet by mouth as needed (three times a week). hydroCHLOROthiazide (HYDRODIURIL) 50 mg tablet (Taking) Take 1 Tablet by mouth daily. apixaban (Eliquis) 5 mg tablet (Taking) Take 1 Tablet by mouth two (2) times a day. simvastatin (ZOCOR) 20 mg tablet (Taking) Take 1 Tablet by mouth nightly. metoprolol succinate (TOPROL-XL) 25 mg XL tablet (Taking) Take 1 Tablet by mouth daily. ramipriL (ALTACE) 10 mg capsule (Taking) Take 1 Capsule by mouth daily. BP Readings from Last 6 Encounters:   21 (!) 156/63   21 120/60   21 (!) 140/70   19 158/90   10/30/19 190/82   10/24/19 197/90        5. Dyslipidemia  On simvastatin  At goal , denies excess muscle aches or new liver issues  Key Antihyperlipidemia Meds             simvastatin (ZOCOR) 20 mg tablet (Taking) Take 1 Tablet by mouth nightly. Lab Results   Component Value Date/Time    LDL, calculated 85 2017 12:10 PM       6. Nasal mass  See Dr Jennyfer Wagoner on Wednesday. I will defer for Rx for vismodegib to oncology     Impression:   1. Nonrheumatic aortic valve stenosis    2. Coronary artery disease involving native coronary artery of native heart without angina pectoris    3. Persistent atrial fibrillation ([facility] [address]    4. Essential hypertension    5. Dyslipidemia    6. Nasal mass       Cardiac History:      CABG Off pump 14 LIMA TO LAD, SVG TO DIAG, SVG TO OM2    CATH 14 complex distal lm, ostial lcx, non dominant rca. Normal lvef    TTE 2012 LVEF 55 % to 60 %. No RWMA. LVH, DD. JESS. MAC, mild to mod MR, mild ASc. Mild TR. Medical history: Includes colon cancer in . Cranial surgeries in  and . Fractured vertebra in . Holter monitor 10/7/14 rhythm atrial fibrillation, PVCs, rate 34-89, 28 pauses up to 3.2 seconds, no PACs, frequent PVCs, 9.85% of all beats at 9,471 with some periods of ventricular bigeminy and trigeminy. Social history: Nonsmoker. No alcohol. Drinks one cup of coffee a day. Single. No children. Enjoys painting, reading, and is retired. Family history: Mother  of breast cancer at 72.  Father  of heart attack at 54. SOLO 11-7-14=EF 55 % to 60 %. Moderate LAE with \"smoke\"   Left atrial appendage: The appendage was mildly dilated. There was a probable, medium-sized, regular, solid mass, measuring 11 mm x 9 mm in the  body of the appendage. There was moderate continuous spontaneous echo contrast (\"smoke\") in the appendage. Small PFO, moderate JARON,moderate MR; mild TR  Right atrium: The atrium was moderately dilated. Aortic valve: Systolic area of 2.1 cm squared by planimetry. mild atheroma in the mid descending  Due to thrombus in LA appendage, the cardioversion is cancelled. ROS-except as noted above. . A complete cardiac and respiratory are reviewed and negative except as above ; Resp-denies wheezing  or productive cough,. Const- No unusual weight loss or fever; Neuro-no recent seizure or CVA ; GI- No BRBPR, abdom pain, bloating ; - no  hematuria   see supplement sheet, initialed and to be scanned by staff  Past Medical History:   Diagnosis Date    Aortic stenosis 10/24/2014    ATN (acute tubular necrosis) ([facility] [address] 1/4/2011    CAD (coronary artery disease) 4/23/2014    Cancer of transverse colon ([facility] [address] 1/20/2011    surgery    Dyslipidemia 6/12/2014    Gastrointestinal disorder     Heart bloc atrioventricular 4/21/2014    Hypertension     Junctional rhythm 1/3/2011    PAF (paroxysmal atrial fibrillation) (HCC)     PVC's (premature ventricular contractions) 9/8/2014    S/P CABG x 3 4/28/2014    Skin cancer       Social Hx= reports that he has never smoked. He has never used smokeless tobacco. He reports that he does not drink alcohol and does not use drugs. Exam and Labs:  /60 (BP 1 Location: Left upper arm, BP Patient Position: Sitting, BP Cuff Size: Adult)   Pulse 69   Resp 16   Ht 5' 6\" (1.676 m)   Wt 157 lb 3.2 oz (71.3 kg)   SpO2 99%   BMI 25.37 kg/m² Constitutional:  NAD, comfortable  Head: NC,AT. Eyes: No scleral icterus. Neck:  Neck supple. No JVD present. Throat: moist mucous membranes. Chest: Effort normal & normal respiratory excursion . Neurological: alert, conversant and oriented . Skin: Skin is not cold. No obvious systemic rash noted. Not diaphoretic. No erythema. Psychiatric:  Grossly normal mood and affect. Behavior appears normal. Extremities:  no clubbing or cyanosis. Abdomen: non distended    Lungs:breath sounds normal. No stridor. distress, wheezes or  Rales. Heart:3/6 medium short systolic mumur normal rate, regular rhythm, normal S1, S2, no rubs, clicks or gallops , PMI non displaced. Edema: Edema is 2+ to the knees bilateral.  Lab Results   Component Value Date/Time    Cholesterol, total 158 05/31/2017 12:10 PM    HDL Cholesterol 63 05/31/2017 12:10 PM    LDL, calculated 85 05/31/2017 12:10 PM    Triglyceride 52 05/31/2017 12:10 PM    CHOL/HDL Ratio 3.7 04/24/2014 06:08 AM     Lab Results   Component Value Date/Time    Sodium 143 11/01/2019 12:04 PM    Potassium 4.0 11/01/2019 12:04 PM    Chloride 99 11/01/2019 12:04 PM    CO2 26 11/01/2019 12:04 PM    Anion gap 6 11/07/2014 10:01 AM    Glucose 104 (H) 11/01/2019 12:04 PM    BUN 29 (H) 11/01/2019 12:04 PM    Creatinine 1.34 (H) 11/01/2019 12:04 PM    BUN/Creatinine ratio 22 11/01/2019 12:04 PM    GFR est AA 57 (L) 11/01/2019 12:04 PM    GFR est non-AA 49 (L) 11/01/2019 12:04 PM    Calcium 9.6 11/01/2019 12:04 PM      Wt Readings from Last 3 Encounters:   05/24/21 157 lb 3.2 oz (71.3 kg)   05/21/21 155 lb (70.3 kg)   05/06/21 155 lb (70.3 kg)      BP Readings from Last 3 Encounters:   05/24/21 120/60   05/06/21 (!) 140/70   11/01/19 158/90      Current Outpatient Medications   Medication Sig    bumetanide (BUMEX) 1 mg tablet Take 1 Tablet by mouth as needed (three times a week).  hydroCHLOROthiazide (HYDRODIURIL) 50 mg tablet Take 1 Tablet by mouth daily.  apixaban (Eliquis) 5 mg tablet Take 1 Tablet by mouth two (2) times a day.  simvastatin (ZOCOR) 20 mg tablet Take 1 Tablet by mouth nightly.     metoprolol succinate (TOPROL-XL) 25 mg XL tablet Take 1 Tablet by mouth daily.  ramipriL (ALTACE) 10 mg capsule Take 1 Capsule by mouth daily.  vismodegib (Erivedge) 150 mg cap Take  by mouth. (Patient not taking: Reported on 5/24/2021)    ferrous sulfate 325 mg (65 mg iron) tablet Take  by mouth Daily (before breakfast). (Patient not taking: Reported on 5/24/2021)     No current facility-administered medications for this visit. Impression see above.       Written by Inez Patten, as dictated by Og Huerta MD.

## 2021-05-27 ENCOUNTER — OFFICE VISIT (OUTPATIENT)
Dept: ONCOLOGY | Age: 86
End: 2021-05-27

## 2021-05-27 ENCOUNTER — DOCUMENTATION ONLY (OUTPATIENT)
Dept: ONCOLOGY | Age: 86
End: 2021-05-27

## 2021-05-27 VITALS
RESPIRATION RATE: 18 BRPM | WEIGHT: 157 LBS | BODY MASS INDEX: 25.34 KG/M2 | TEMPERATURE: 99 F | HEART RATE: 65 BPM | OXYGEN SATURATION: 96 % | DIASTOLIC BLOOD PRESSURE: 63 MMHG | SYSTOLIC BLOOD PRESSURE: 156 MMHG

## 2021-05-27 DIAGNOSIS — I10 ESSENTIAL HYPERTENSION: ICD-10-CM

## 2021-05-27 DIAGNOSIS — C44.91 BASAL CELL ADENOCARCINOMA: Primary | ICD-10-CM

## 2021-05-27 DIAGNOSIS — I25.739: ICD-10-CM

## 2021-05-27 PROCEDURE — G8432 DEP SCR NOT DOC, RNG: HCPCS | Performed by: INTERNAL MEDICINE

## 2021-05-27 PROCEDURE — G8754 DIAS BP LESS 90: HCPCS | Performed by: INTERNAL MEDICINE

## 2021-05-27 PROCEDURE — 1101F PT FALLS ASSESS-DOCD LE1/YR: CPT | Performed by: INTERNAL MEDICINE

## 2021-05-27 PROCEDURE — G8753 SYS BP > OR = 140: HCPCS | Performed by: INTERNAL MEDICINE

## 2021-05-27 PROCEDURE — G8536 NO DOC ELDER MAL SCRN: HCPCS | Performed by: INTERNAL MEDICINE

## 2021-05-27 PROCEDURE — G8419 CALC BMI OUT NRM PARAM NOF/U: HCPCS | Performed by: INTERNAL MEDICINE

## 2021-05-27 PROCEDURE — G8427 DOCREV CUR MEDS BY ELIG CLIN: HCPCS | Performed by: INTERNAL MEDICINE

## 2021-05-27 PROCEDURE — 99205 OFFICE O/P NEW HI 60 MIN: CPT | Performed by: INTERNAL MEDICINE

## 2021-05-27 NOTE — PROGRESS NOTES
[facility] [place] [facility] [place] [address] [address]  W: [phone]  F: [phone]    Reason for Visit:   Trudy Vidal is a 82 y.o. male who is seen in consultation at the request of Randee Andino for evaluation of Basal cell carcinoma     Treatment History:   · 10/2019- Vismodegib    History of Present Illness:   Patient is a 82 y.o. male with CAD/ CABG, HTN, Afib seen for skin cancer. His son comes with him  The son tells me that in 2019 he was diagnosed with upper lip Basal cell carcinoma in 2019 . He was not thought to be an operative candidate and was started on Vismodegib. He responded to this and was on it until 9//2020 . The Dermatology office closed and [place]. He now comes to discuss treatment. He has since had no swelling , has a non bleeding upper lip ulcer, no bleeding, he has some trouble with holding fluids in his mouth, no lumps, no fevers. He travels to [facility] .     Past Medical History:   Diagnosis Date    Aortic stenosis 10/24/2014    [facility]N [facility]acute tubular necrosis) [facility] [address] 1/4/2011    CAD (coronary artery disease) 4/23/2014    Cancer of transverse colon (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 Giordano Must  mid April 2014    Triple bypass      Social History     Tobacco Use    Smoking status: Never Smoker    Smokeless tobacco: Never Used   Substance Use Topics    Alcohol use: No      Family History   Problem Relation Age of Onset    Breast Cancer Mother     Heart Attack Father      Current Outpatient Medications   Medication Sig    bumetanide (BUMEX) 1 mg tablet Take 1 Tablet by mouth as needed (three times a week).  hydroCHLOROthiazide (HYDRODIURIL) 50 mg tablet Take 1 Tablet by mouth daily.  apixaban (Eliquis) 5 mg tablet Take 1 Tablet by mouth two (2) times a day.  simvastatin (ZOCOR) 20 mg tablet Take 1 Tablet by mouth nightly.  metoprolol succinate (TOPROL-XL) 25 mg XL tablet Take 1 Tablet by mouth daily.  ramipriL (ALTACE) 10 mg capsule Take 1 Capsule by mouth daily.  vismodegib (Erivedge) 150 mg cap Take  by mouth. (Patient not taking: Reported on 5/27/2021)     No current facility-administered medications for this visit. No Known Allergies     Review of Systems: A complete review of systems was obtained, negative except as described above. Physical Exam:     Visit Vitals  BP (!) 156/63 (BP 1 Location: Left upper arm, BP Patient Position: Sitting)   Pulse 65   Temp 99 °F (37.2 °C)   Resp 18   Wt 157 lb (71.2 kg)   SpO2 96%   BMI 25.34 kg/m²     ECOG PS: 1  General: No distress  Eyes: PERRLA, anicteric sclerae  HENT: Atraumatic, OP clear. Left upper lip clean base ulcer with no mass, minor oozing  Neck: Supple  Lymphatic: No cervical, supraclavicular, or inguinal adenopathy  Respiratory:  normal respiratory effort  CV: Normal rate, regular rhythm, no murmurs, no peripheral edema  GI: Soft, nontender, nondistended, no masses, no hepatomegaly, no splenomegaly  MS: Normal gait and station. Digits without clubbing or cyanosis. Skin: No rashes, ecchymoses, or petechiae. Normal temperature, turgor, and texture. Psych: Alert, oriented, appropriate affect, normal judgment/insight    Results:     Lab Results   Component Value Date/Time    WBC 7.2 11/01/2019 12:04 PM    HGB 12.3 (L) 11/01/2019 12:04 PM    HCT 36.6 (L) 11/01/2019 12:04 PM    PLATELET 467 98/99/2938 12:04 PM    MCV 94 11/01/2019 12:04 PM    ABS. NEUTROPHILS 4.6 11/01/2019 12:04 PM    Hemoglobin (POC) 7.5 (L) 01/03/2011 03:16 PM    Hematocrit (POC) 22 (L) 01/03/2011 03:16 PM     Lab Results   Component Value Date/Time    Sodium 143 11/01/2019 12:04 PM    Potassium 4.0 11/01/2019 12:04 PM    Chloride 99 11/01/2019 12:04 PM    CO2 26 11/01/2019 12:04 PM    Glucose 104 (H) 11/01/2019 12:04 PM    BUN 29 (H) 11/01/2019 12:04 PM    Creatinine 1.34 (H) 11/01/2019 12:04 PM    GFR est AA 57 (L) 11/01/2019 12:04 PM    GFR est non-AA 49 (L) 11/01/2019 12:04 PM    Calcium 9.6 11/01/2019 12:04 PM    Sodium (POC) 142 01/03/2011 03:16 PM    Potassium (POC) 4.0 01/03/2011 03:16 PM    Chloride (POC) 111 (H) 01/03/2011 03:16 PM    Glucose (POC) 150 (H) 04/30/2014 11:26 AM    BUN (POC) 31 (H) 01/03/2011 03:16 PM    Creatinine (POC) 1.4 (H) 01/03/2011 03:16 PM    Calcium, ionized (POC) 1.13 01/03/2011 03:16 PM     Lab Results   Component Value Date/Time    Bilirubin, total 1.1 11/01/2019 12:04 PM    ALT (SGPT) 13 11/01/2019 12:04 PM    Alk. phosphatase 71 11/01/2019 12:04 PM    Protein, total 7.7 11/01/2019 12:04 PM    Albumin 4.6 11/01/2019 12:04 PM    Globulin 2.7 04/26/2014 04:27 AM         Records reviewed and summarized above. Pathology report(s) reviewed     10/30/2019       Skin, left upper lip, shave biopsy:   Solid basal cell carcinoma, present at the biopsy base     Radiology report(s) reviewed above. ECHO 5/2021  · LV: Estimated LVEF is 55 - 60%. Biplane method used to measure ejection fraction. Normal cavity size and systolic function (ejection fraction normal). Moderate concentric hypertrophy.  Wall motion: normal      Assessment:   1) Basal cell carcinoma of upper Lip    Diagnosed 2019 and at that time was told by Dermatology that he is not an operative candidate  He was started on Vismodegib to which he had a response and tolerated well ( continuous therapy), ran out 9/2021    He now has an ulcer but we dont know if he has residual cancer and whether this is now operable  Vismodegib is not curative, has several side effects and hence I would suggest he has a repeat DErm evaluation  If he has residual disease and is unresectable I would resume Vismodegib with a plan to taper in 2 months to the minimal effective dose     2) CAD s/p CABG    3) Afib   On Eliquis    4) Aortic stenosis    5) Psychosocial  He is travelling to [place] tomorrow  He will find a dermatologist there and get a biopsy  His son wants to help co ordinate care and will let us know the results  Once he returns he is willing to see a Dermatologist here to discuss excision  If not resectable he will start Vismodegb    They request VV appointments  Met with SW  Signed AMD       Plan:     · Derm to evaluate for bx and resectability  · RTC 8 weeks    I appreciate the opportunity to participate in Mr. Phan Necessary care.     Signed By: Aj Vasquez MD

## 2021-05-27 NOTE — PROGRESS NOTES
Oncology Social Work  Psychosocial Assessment    Reason for Assessment:      [] Social Work Referral [x] Initial Assessment [] New Diagnosis [] Other    Advance Care Planning:  Completed an AMD with the patient today. AMD scanned into [facility]. Three copies were given to the patient. Sources of Information:    [x]Patient  [x]Family  []Staff  []Medical Record    Mental Status:    [x]Alert  []Lethargic  []Unresponsive   [] Unable to assess   Oriented to:  [x]Person  [x]Place  [x]Time  [x]Situation      Relationship Status:  []Single  []  []Significant Other/Life Partner  [x]  []  [x]     Living Circumstances:  []Lives Alone  [x]Family/Significant Other in Household  []Roommates  []Children in the Home  []Paid Caregivers  []Assisted Living Facility/Group Home  []Skilled [address]  []Homeless  []Incarcerated  []Environmental/Care Concerns  []Other:    Employment Status:  []Employed Full-time []Employed Part-time []Homemaker  [x] Retired [] Short-Term Disability [] [facility]  [] Unemployed     Barriers to Learning:    []Language  []Developmental  []Cognitive  []Altered Mental Status  []Visual/Hearing Impairment  []Unable to Read/Write  []Motivational  [] Challenges Understanding Medical Jargon [x]No Barriers Identified      Financial/Legal Concerns:    []Uninsured  []Limited Income/Resources  []Non-Citizen  []Food Insecurity [x]No Concerns Identified   []Other:    Confucianist/Spiritual/Existential:  Does patient have any spiritual or Oriental orthodox beliefs? [x] Yes [] No  Is the patient involved in a spiritual, marine or Oriental orthodox community? [] Yes [] No  Patient expressing spiritual/existential angst? [] Yes [x] No  Notes: Patient identifies as [place] [address].       Support System:    Identified Support Person/Group:  [x]Strong  []Fair  []Limited    Coping with Illness:   [x]  Coping Well  [] Challenges Coping with Serious Illness [] Situational Depression [] Situational Anxiety [] Anticipatory Grief  [] Recent Loss [] Caregiver [place]            Narrative:   Met with the patient and his friend/\"son\" (Trevin Tapia) during his office visit today. Patient is being seen for basal cell carcinoma. The patient was referred to Oncology, by his Cardiologist, Dr. Alexandra Grey.  He does not have a PCP, but typically utilizes [facility].  Provided a list of [facility] Primary Care Physicians from which to choose a provider. Patient lives in the [facility]. He has been back in the [address] [place] [address] for three weeks now, staying with Trevin Tapia, and returns to the [place]. He is , and his ex-wife later passed away. He does not have any children. The patient met his friend/\"son\" (Trevin Tapia) while Trevin Tapia was in college. They worked in the same place during that time. 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 Patient has Medicare, but does not have Medicare Part D. He explained that he previously received financial assistance for a drug, Vismodegib, through [employer]. Dr. Marisol Islas may prescribe this in the future, depending on biopsy results. The patient signed financial assistance paperwork for [employer], for Vismodegib, today, in the event that he needs to apply for this program in the future. Paperwork scanned to Claude Waddell, Case Management Assistant/Financial Navigator. Provided this 's contact information and offered continued support as needed. Plan:   1. Introduced self and role of this  in the [employer]. 2.  Ongoing psychosocial support as desired by patient.       Referral:   Insurance/Entitlements referral  Financial/Medication assistance referral   Brandee Rock LCSW

## 2021-05-27 NOTE — PROGRESS NOTES
Preethi Umana is a 82 y.o. male    Chief Complaint   Patient presents with    New Patient     Nasal Mass       1. Have you been to the ER, urgent care clinic since your last visit? Hospitalized since your last visit? No    2. Have you seen or consulted any other health care providers outside of the [address] [place] [address] since your last visit? Include any pap smears or colon screening.  No

## 2021-06-01 DIAGNOSIS — C44.91 BASAL CELL ADENOCARCINOMA: Primary | ICD-10-CM

## 2021-06-02 ENCOUNTER — DOCUMENTATION ONLY (OUTPATIENT)
Dept: ONCOLOGY | Age: 86
End: 2021-06-02

## 2021-06-02 NOTE — PROGRESS NOTES
Called patients son, Carole Huang verified x2. Referral to [facility] [place] [address] for 6/3/21 scheduled, patients son stated that his father  Is back home in the [facility] and is unsure when he will be back. Son stated that he would  Call us when he knew a return date so we can move forward scheduling the derm appt. Appointment canceled.

## 2021-06-20 NOTE — TELEPHONE ENCOUNTER
Forensics to come see the patient. I spoke w/ Ayanna Arias and he relayed that the pt is reluctant to travel due to coronovirus risks. We will keep appt for now and at least talk via Asad.

## 2021-08-06 ENCOUNTER — PATIENT MESSAGE (OUTPATIENT)
Dept: CARDIOLOGY CLINIC | Age: 86
End: 2021-08-06

## 2021-08-09 NOTE — TELEPHONE ENCOUNTER
Verified patient with two types of identifiers. Spoke to Graham (on [facility]) and he thinks Mr. Paul Vidales will be discharged today or tomorrow. He may need home oxygen. [place] will let us know if they need anything else. Patient verbalized understanding and will call with any other questions.

## 2021-08-24 ENCOUNTER — TELEPHONE (OUTPATIENT)
Dept: ONCOLOGY | Age: 86
End: 2021-08-24

## 2021-08-24 ENCOUNTER — VIRTUAL VISIT (OUTPATIENT)
Dept: ONCOLOGY | Age: 86
End: 2021-08-24

## 2021-08-24 DIAGNOSIS — C44.91 BASAL CELL ADENOCARCINOMA: Primary | ICD-10-CM

## 2021-08-24 PROCEDURE — G8756 NO BP MEASURE DOC: HCPCS | Performed by: INTERNAL MEDICINE

## 2021-08-24 PROCEDURE — 99215 OFFICE O/P EST HI 40 MIN: CPT | Performed by: INTERNAL MEDICINE

## 2021-08-24 PROCEDURE — 1101F PT FALLS ASSESS-DOCD LE1/YR: CPT | Performed by: INTERNAL MEDICINE

## 2021-08-24 PROCEDURE — G8432 DEP SCR NOT DOC, RNG: HCPCS | Performed by: INTERNAL MEDICINE

## 2021-08-24 PROCEDURE — G8427 DOCREV CUR MEDS BY ELIG CLIN: HCPCS | Performed by: INTERNAL MEDICINE

## 2021-08-24 RX ORDER — VISMODEGIB 150 MG/1
150 CAPSULE ORAL SEE ADMIN INSTRUCTIONS
Qty: 20 CAPSULE | Refills: 3 | Status: SHIPPED | OUTPATIENT
Start: 2021-08-24 | End: 2021-08-25 | Stop reason: SDUPTHER

## 2021-08-24 NOTE — PROGRESS NOTES
Jazmin Isaac is a 82 y.o. male  Chief Complaint   Patient presents with    Follow-up    Cancer     basal cell carcinoma of upper lip     1. Have you been to the ER, urgent care clinic since your last visit? Hospitalized since your last visit? Yes, Esmer Palumbo for pneumonia - in the hospital for about a week 8/9/21 - 8/16/21    2. Have you seen or consulted any other health care providers outside of the [address] [place] [address] since your last visit? Include any pap smears or colon screening.   No

## 2021-08-24 NOTE — Clinical Note
Needs orders    Mary Ann Corporal please call and consent   Son Mel Shaw needs to be called    Maria Eugenia Bach the med will be shipped to the son as will the lab orders biweekly

## 2021-08-24 NOTE — PROGRESS NOTES
[facility] [place] [facility] [place] [address] [address]  W: [phone]  F: [phone]    Reason for Visit:   Britney Miles is a 82 y.o. male who is seen for follow up of Basal cell carcinoma     Britney Miles is a 82 y.o. male who is seen by synchronous (real-time) audio-video technology on 8/24/2021 for follow up   Treatment History:   · 10/2019- Vismodegib    History of Present Illness:   Patient is a 82 y.o. male with CAD/ CABG, HTN, Afib seen for skin cancer. His son comes with him  The son tells me that in 2019 he was diagnosed with upper lip Basal cell carcinoma in 2019 . He was not thought to be an operative candidate and was started on Vismodegib. He responded to this and was on it until 9//2020 . The Dermatology office closed and [place] went out [place]. He was last seen 5/27/2021, one day before he was to travel to [place]. He was to see dermatology and follow up. Son Isidro Cannon with him  States that he saw the dermatologist. His bx showed residual disease  Considered unresectable. He has no new lumps or pain      He travels to [facility] .     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 Davie Rooney  mid April 2014    Triple bypass      Social History     Tobacco Use    Smoking status: Never Smoker    Smokeless tobacco: Never Used   Substance Use Topics    Alcohol use: No      Family History   Problem Relation Age of Onset    Breast Cancer Mother     Heart Attack Father      Current Outpatient Medications   Medication Sig    bumetanide (BUMEX) 1 mg tablet Take 1 Tablet by mouth as needed (three times a week).  hydroCHLOROthiazide (HYDRODIURIL) 50 mg tablet Take 1 Tablet by mouth daily.  apixaban (Eliquis) 5 mg tablet Take 1 Tablet by mouth two (2) times a day.  simvastatin (ZOCOR) 20 mg tablet Take 1 Tablet by mouth nightly.  metoprolol succinate (TOPROL-XL) 25 mg XL tablet Take 1 Tablet by mouth daily.  ramipriL (ALTACE) 10 mg capsule Take 1 Capsule by mouth daily.  vismodegib (Erivedge) 150 mg cap Take  by mouth. (Patient not taking: Reported on 5/27/2021)     No current facility-administered medications for this visit. No Known Allergies     Review of Systems: A complete review of systems was obtained, negative except as described above. Physical Exam:       Due to this being a TeleHealth evaluation, many elements of the physical examination are unable to be assessed. HENT: Atraumatic, OP clear. Left upper lip clean base ulcer with no mass, minor oozing  Respiratory:  normal respiratory effort  MS: Normal gait and station. Digits without clubbing or cyanosis. Psych: Alert, oriented, appropriate affect, normal judgment/insight    Results:     Lab Results   Component Value Date/Time    WBC 7.2 11/01/2019 12:04 PM    HGB 12.3 (L) 11/01/2019 12:04 PM    HCT 36.6 (L) 11/01/2019 12:04 PM    PLATELET 765 77/15/9162 12:04 PM    MCV 94 11/01/2019 12:04 PM    ABS.  NEUTROPHILS 4.6 11/01/2019 12:04 PM    Hemoglobin (POC) 7.5 (L) 01/03/2011 03:16 PM    Hematocrit (POC) 22 (L) 01/03/2011 03:16 PM     Lab Results   Component Value Date/Time    Sodium 143 11/01/2019 12:04 PM    Potassium 4.0 11/01/2019 12:04 PM    Chloride 99 11/01/2019 12:04 PM    CO2 26 11/01/2019 12:04 PM    Glucose 104 (H) 11/01/2019 12:04 PM    BUN 29 (H) 11/01/2019 12:04 PM    Creatinine 1.34 (H) 11/01/2019 12:04 PM    GFR est AA 57 (L) 11/01/2019 12:04 PM    GFR est non-AA 49 (L) 11/01/2019 12:04 PM    Calcium 9.6 11/01/2019 12:04 PM    Sodium (POC) 142 01/03/2011 03:16 PM    Potassium (POC) 4.0 01/03/2011 03:16 PM    Chloride (POC) 111 (H) 01/03/2011 03:16 PM    Glucose (POC) 150 (H) 04/30/2014 11:26 AM    BUN (POC) 31 (H) 01/03/2011 03:16 PM    Creatinine (POC) 1.4 (H) 01/03/2011 03:16 PM    Calcium, ionized (POC) 1.13 01/03/2011 03:16 PM     Lab Results   Component Value Date/Time    Bilirubin, total 1.1 11/01/2019 12:04 PM    ALT (SGPT) 13 11/01/2019 12:04 PM    Alk. phosphatase 71 11/01/2019 12:04 PM    Protein, total 7.7 11/01/2019 12:04 PM    Albumin 4.6 11/01/2019 12:04 PM    Globulin 2.7 04/26/2014 04:27 AM         Records reviewed and summarized above. Pathology report(s) reviewed     10/30/2019       Skin, left upper lip, shave biopsy:   Solid basal cell carcinoma, present at the biopsy base     Radiology report(s) reviewed above. ECHO 5/2021  · LV: Estimated LVEF is 55 - 60%. Biplane method used to measure ejection fraction. Normal cavity size and systolic function (ejection fraction normal). Moderate concentric hypertrophy.  Wall motion: normal    OUTSIDE PATH is in [place]ans[place]      Assessment:   1) Basal cell carcinoma of upper Lip    Diagnosed 2019 and at that time was told by Dermatology that he is not an operative candidate  He was started on Vismodegib to which he had a response and tolerated well ( continuous therapy), ran out 9/2020    He now has an ulcer with bx proven Basal cell cancer  He is told this is not resectable  He declines RT  Vismodegib is not curative, has several side effects including life threatening electrolyte abnormalities  [place] and it was emphasized that such treatment can only be prescribed if adequate lab monitoring can be reassured      I would resume Vismodegib with a plan to taper in 2 months to the minimal effective dose     2) CAD s/p CABG    3) Afib   On Eliquis    4) Aortic stenosis    5) Psychosocial  In [facility] for a few months  Son Tirso Golden manages care form US          Plan:     · Vismodegib 150 mg Monday - Friday -  · CBC, CMP, Mg bi weekly and fax results to us    RTC VV Monthly     I appreciate the opportunity to participate in Mr. Kahn Blocker care. Signed By: Marily Palm MD      The patient was evaluated through a synchronous (real-time) audio-video encounter. The patient (or guardian if applicable) is aware that this is a billable service. Verbal consent to proceed has been obtained within the past 12 months. The visit was conducted pursuant to the emergency declaration under the [facility], [address] [place] [address] authority and the [employer] and [place] General Act. Patient identification was verified, and a caregiver was present when appropriate. The patient was located in a state where the provider was credentialed to provide care.

## 2021-08-24 NOTE — TELEPHONE ENCOUNTER
Oncology Pharmacist Note:        Nadine Anguiano is a  82 y.o.male  diagnosed with basal cell carcinoma Mr. Allen Watters is being treated with vismodegib. Attempted to contact [facility] per Dr. Sánchez Res request to teach and consent for treatment. No answer left message to contact office.      Hamilton Galo, PharmD, BCPS, BCOP    For Pharmacy Admin Tracking Only     CPA in place: No      Time Spent (min): 5

## 2021-08-25 DIAGNOSIS — C44.91 BASAL CELL ADENOCARCINOMA: Primary | ICD-10-CM

## 2021-08-25 RX ORDER — VISMODEGIB 150 MG/1
150 CAPSULE ORAL SEE ADMIN INSTRUCTIONS
Qty: 20 CAPSULE | Refills: 3 | Status: SHIPPED | OUTPATIENT
Start: 2021-08-25

## 2021-08-27 ENCOUNTER — TELEPHONE (OUTPATIENT)
Dept: ONCOLOGY | Age: 86
End: 2021-08-27

## 2021-08-27 NOTE — TELEPHONE ENCOUNTER
Oncology Pharmacist Note:           Thuy Torrez is a  82 y.o.male  diagnosed with basal cell carcinoma Mr. Crissy Hassan is being treated with vismodegib.      Attempted to contact [facility] per Dr. Paul Lei request to teach and consent for treatment.  No answer left message to contact office.      Ronaldo Fuller, PharmD, [facility], [facility] in place: No      Time Spent (min): 5

## 2021-09-03 ENCOUNTER — TELEPHONE (OUTPATIENT)
Dept: ONCOLOGY | Age: 86
End: 2021-09-03

## 2021-09-03 ENCOUNTER — DOCUMENTATION ONLY (OUTPATIENT)
Dept: ONCOLOGY | Age: 86
End: 2021-09-03

## 2021-09-03 NOTE — TELEPHONE ENCOUNTER
Pharmacy Note- Chemotherapy Education    Thuy Torrez is a  82 y.o.male  diagnosed with basal cell carcinoma. [facility] Dejesus contacted today via telephonse for chemotherapy counseling and consent. Mr. Crissy Hassan is being treated with vismodegib. Provided education on vismodegib. Mr. Crissy Hassan is restarting treatment after a treatment break and is currently in the [facility] and Mr. Patrick Rangel helps manage his treatment from the [address]. Mr. Patrick Rangel stated that the medication should be received possibly tomorrow and [facility] was shipping out today (9/3). Side effects of chemotherapy reviewed included s/s appetite changes,  fatigue, hair loss/alopecia, muscle spasms, skin and nail changes, diarrhea/constipation and rashes. Patient given ways to manage these side effects and when to contact office. Mr. Patrick Rangel verbalized understanding of the information presented and all of the patient's questions were answered.     Ronaldo Fuller, PharmD, BCPS, BCOP    For Pharmacy Admin Tracking Only     CPA in place: No   Recommendation Provided To: Patient/Caregiver: 1 via Telephone     Intervention Accepted By: Patient/Caregiver: 1   Time Spent (min): 10

## 2021-09-03 NOTE — PROGRESS NOTES
Oncology Pharmacist Note:           Hung Peck is a  15 y.o.male  diagnosed with basal cell carcinoma Mr. Peck is being treated with vismodegib.      Attempted to contact [facility] for 3rd time per Dr. Song Rae request to teach and consent for treatment.  No answer left message to contact office.      Jose Mares, PharmD, BCPS, BCOP    For Pharmacy Admin Tracking Only     CPA in place: No     Time Spent (min): 5

## 2021-09-27 ENCOUNTER — TELEPHONE (OUTPATIENT)
Dept: ONCOLOGY | Age: 86
End: 2021-09-27

## 2021-11-22 ENCOUNTER — TELEPHONE (OUTPATIENT)
Dept: ONCOLOGY | Age: 86
End: 2021-11-22

## 2021-11-22 NOTE — TELEPHONE ENCOUNTER
Attempted to call pt emergency contact again [facility] as I never heard back from him. He states that Mr. Rob Damon has been doing well on the vismodegib. He is unaware of any blood work that was needed so he is requesting us to mail or email these orders and he will have MD in  order them and fax to us. I also advised he needs a VV in the coming weeks which he is agreeable to. He had no additional questions/concerns & appreciated my call.